# Patient Record
Sex: FEMALE | Race: WHITE | Employment: OTHER | URBAN - METROPOLITAN AREA
[De-identification: names, ages, dates, MRNs, and addresses within clinical notes are randomized per-mention and may not be internally consistent; named-entity substitution may affect disease eponyms.]

---

## 2017-02-21 ENCOUNTER — OFFICE VISIT (OUTPATIENT)
Dept: ENDOCRINOLOGY | Age: 67
End: 2017-02-21

## 2017-02-21 VITALS
BODY MASS INDEX: 39.66 KG/M2 | SYSTOLIC BLOOD PRESSURE: 140 MMHG | HEIGHT: 66 IN | DIASTOLIC BLOOD PRESSURE: 62 MMHG | HEART RATE: 59 BPM | WEIGHT: 246.8 LBS

## 2017-02-21 DIAGNOSIS — E89.0 POSTSURGICAL HYPOTHYROIDISM: ICD-10-CM

## 2017-02-21 DIAGNOSIS — C73 PAPILLARY CARCINOMA OF THYROID (HCC): Primary | ICD-10-CM

## 2017-02-21 NOTE — PROGRESS NOTES
Chief Complaint   Patient presents with    Thyroid Problem     pcp and pharmacy verified   Records since last visit reviewed  History of Present Illness: Cady Reynolds is a 61 y.o. female here for follow up of thyroid cancer and post-surgical hypothyroidism. Pt notes that it all started in January 2016. She was seeing Dr. Yassine Delcid for issues of multiple ear infections. Dr. Yassine Delcid noted a thyroid nodule. She was sent for an FNA, which came back positive for papillary cancer. Pt was taken to the OR on 4/15/16, her surgical pathology showed multifocal papillary thyroid cancer the largest being 4.5cm in size, no tumor capsule, no evidence of extra thyroidal extension. Pt was sent for TSH stimulated MURRAY, she received 106 mCi of I-131 on 6/24/16. The one week post WBS showed uptake in the thyroid bed only. After MURRAY she was placed back on LT4 175mcg daily. Prior to MURRAY her TgAb was 137.9. At her last visit in November 2016 her TgAb was up to 110 and her Tg was lower. Her TSH was 0.029 with FT4 of 1.6 on LT4 150mcg daily. I decreased her dose to 150mcg 6 days weekly. Her Neck US in November 2016 showed a new mass that measured 6mm x 4mm x4mm mass in the left neck, I sent her for FNA and Tg washout to see if this was scar tissue or evidence of residual thyroid cancer. She went for the FNA on 12/13/16 and the pre-procedure US did NOT see the mass in the left thyroid bed so no FNA was performed. Pt is taking the LT4 150mcg Mon-Sat. She denies issues of tremors, palpitations, SOB, CP, diarrhea or heat or cold intolerance. She denies issues of dysphagia, dysphonia, chocking sensations, or hoarseness in her voice. Pt notes she has been having issues of muscle spasm in her lower left back. She is going back to PT and she notes it has been improving. Pt is followed by [facility] at [facility], Dr. Laura Loja, for trigeminal Neuralgia. She sees him next week.   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 Current Outpatient Prescriptions   Medication Sig    levothyroxine (SYNTHROID) 150 mcg tablet Take 1 Tab by mouth Daily (before breakfast). Note new dose    Aspirin, Buffered 81 mg tab Take 1 Tab by mouth daily.  gabapentin (NEURONTIN) 600 mg tablet Take 1,200 mg by mouth two (2) times a day.  lovastatin (MEVACOR) 20 mg tablet Take 20 mg by mouth nightly.  metFORMIN (GLUMETZA) 1,000 mg TG24 24 hour tablet Take  by mouth two (2) times a day. No current facility-administered medications for this visit. Allergies   Allergen Reactions    Morphine Itching     Pt said she had severe itching after Knee replacement    Fiorinal [Butalbital-Aspirin-Caffeine] Other (comments)     Pt does not remeber     Review of Systems:  - Cardiovascular: no chest pain  - Neurological: no tremors  - Integumentary: skin is normal    Physical Examination:  Blood pressure 140/62, pulse (!) 59, height 5' 5.5\" (1.664 m), weight 246 lb 12.8 oz (111.9 kg). - General: pleasant, no distress, good eye contact   - Neck: no masses or LAD palpated  - Cardiovascular: regular, normal rate, nl s1 and s2, no m/r/g   - Integumentary: skin is normal, no edema  - Neurological: reflexes 2+ at biceps, no tremors,   - Psychiatric: normal mood and affect    Data Reviewed:   - none new    Assessment/Plan:   1) Thyroid cancer > Pt has no concerning features or physical findings. Will check her Tg and TgAb. If her Tg or TgAb is increasing will repeat the thyroid US. Will want to keep her TSH between 0.1-0.5. 2) Post surgical hypothyroidism > Pt is clinically euthyroid on LT4 150mcg 6 days per week. Will check TFTs today. Goal TSH is 0.1-0.5. Pt voices understanding and agreement with the plan. Pain noted and pt was recommended to call her PCP for further evaluation and treatment, as needed      RTC 6 months      Follow-up Disposition:  Return in about 6 months (around 8/21/2017).     Copy sent to:  Dr. Riley Flower Kegean Rodríguez

## 2017-02-21 NOTE — MR AVS SNAPSHOT
Visit Information Date & Time Provider Department Dept. Phone Encounter #  
 2/21/2017 11:50 AM Liss Alegre, [address] [facility] Diabetes and Endocrinology [phone] [id number] Follow-up Instructions Return in about 6 months (around 8/21/2017). Upcoming Health Maintenance Date Due Hepatitis C Screening 1950 FOOT EXAM Q1 10/22/1962 MICROALBUMIN Q1 10/22/1962 EYE EXAM RETINAL OR DILATED Q1 10/22/1962 Pneumococcal 19-64 Highest Risk (1 of 3 - PCV13) 10/22/1971 DTaP/Tdap/Td series (1 - Tdap) 10/22/1973 PAP AKA CERVICAL CYTOLOGY 10/22/1973 FOBT Q 1 YEAR AGE 50-75 10/22/2002 ZOSTER VACCINE AGE 60> 10/22/2012 HEMOGLOBIN A1C Q6M 10/30/2014 INFLUENZA AGE 9 TO ADULT 8/1/2016 LIPID PANEL Q1 10/12/2017 BREAST CANCER SCRN MAMMOGRAM 4/29/2018 Allergies as of 2/21/2017  Review Complete On: 2/21/2017 By: Liss Alegre MD  
  
 Severity Noted Reaction Type Reactions Morphine High 03/25/2016   Topical Itching Pt said she had severe itching after Knee replacement Fiorinal [Butalbital-aspirin-caffeine]  02/23/2012    Other (comments) Pt does not remeber Current Immunizations  Reviewed on 3/7/2012 Name Date Influenza Vaccine Split 11/7/2011 Not reviewed this visit You Were Diagnosed With   
  
 Codes Comments Papillary carcinoma of thyroid ([facility] [facility]    -  Primary ICD-10-CM: M72 ICD-9-CM: 487 Postsurgical hypothyroidism     ICD-10-CM: E89.0 ICD-9-CM: 244.0 Vitals BP Pulse Height(growth percentile) Weight(growth percentile) BMI OB Status 140/62 (BP 1 Location: Left arm, BP Patient Position: Sitting) (!) 59 5' 5.5\" (1.664 m) 246 lb 12.8 oz (111.9 kg) 40.45 kg/m2 Postmenopausal  
 Smoking Status Former Smoker Vitals History BMI and BSA Data Body Mass Index Body Surface Area 40.45 kg/m 2 2.27 m 2 Preferred Pharmacy Pharmacy Name Phone [facility] [address] [facility], [address] [phone] Your Updated Medication List  
  
   
This list is accurate as of: 2/21/17 12:17 PM.  Always use your most recent med list.  
  
  
  
  
 aspirin, buffered 81 mg Tab Take 1 Tab by mouth daily. gabapentin 600 mg tablet Commonly known as:  NEURONTIN Take 1,200 mg by mouth two (2) times a day. levothyroxine 150 mcg tablet Commonly known as:  SYNTHROID Take 1 Tab by mouth Daily (before breakfast). Note new dose  
  
 lovastatin 20 mg tablet Commonly known as:  MEVACOR Take 20 mg by mouth nightly. metFORMIN 1,000 mg Tg24 24 hour tablet Commonly known as:  Dinorah Hacker Take  by mouth two (2) times a day. We Performed the Following T4, FREE [id number] CPT(R)] THYROGLOBULIN REFLEX PROFILE [id number] CPT(R)] TSH 3RD GENERATION [79917 CPT(R)] Follow-up Instructions Return in about 6 months (around 8/21/2017). Introducing hospitals & [facility] SERVICES! Yolanda Cuevas introduces Identia patient portal. Now you can access parts of your medical record, email your doctor's office, and request medication refills online. 1. In your internet browser, go to [email or web address] 2. Click on the First Time User? Click Here link in the Sign In box. You will see the New Member Sign Up page. 3. Enter your Identia Access Code exactly as it appears below. You will not need to use this code after youve completed the sign-up process. If you do not sign up before the expiration date, you must request a new code. · Identia Access Code: [email or web address] Expires: 5/22/2017 12:17 PM 
 
4. Enter the last four digits of your Social Security Number (xxxx) and Date of Birth (mm/dd/yyyy) as indicated and click Submit. You will be taken to the next sign-up page. 5. Create a Identia ID.  This will be your Identia login ID and cannot be changed, so think of one that is secure and easy to remember. 6. Create a Optoro password. You can change your password at any time. 7. Enter your Password Reset Question and Answer. This can be used at a later time if you forget your password. 8. Enter your e-mail address. You will receive e-mail notification when new information is available in [address] 9. Click Sign Up. You can now view and download portions of your medical record. 10. Click the Download Summary menu link to download a portable copy of your medical information. If you have questions, please visit the Frequently Asked Questions section of the Optoro website. Remember, Optoro is NOT to be used for urgent needs. For medical emergencies, dial 911. Now available from your iPhone and Android! Please provide this summary of care documentation to your next provider. Your primary care clinician is listed as Mary Jimenez. If you have any questions after today's visit, please call [phone].

## 2017-02-21 NOTE — PROGRESS NOTES
Blood pressure has been repeated in opposite arm. Has back pain, per patient. PT is working on her back.

## 2017-02-27 LAB
T4 FREE SERPL-MCNC: 1.64 NG/DL (ref 0.82–1.77)
THYROGLOB AB SERPL-ACNC: 221.5 IU/ML (ref 0–0.9)
THYROGLOB SERPL-MCNC: <2 NG/ML
TSH SERPL DL<=0.005 MIU/L-ACNC: 0.04 UIU/ML (ref 0.45–4.5)

## 2017-02-28 DIAGNOSIS — C73 PAPILLARY CARCINOMA OF THYROID (HCC): Primary | ICD-10-CM

## 2017-02-28 DIAGNOSIS — E89.0 POSTSURGICAL HYPOTHYROIDISM: ICD-10-CM

## 2017-02-28 RX ORDER — LEVOTHYROXINE SODIUM 150 UG/1
TABLET ORAL
Qty: 90 TAB | Refills: 3 | Status: SHIPPED | OUTPATIENT
Start: 2017-02-28 | End: 2018-04-06 | Stop reason: DRUGHIGH

## 2017-02-28 NOTE — PROGRESS NOTES
Spoke with pt regarding her thyroid labs. Pt to continue the LT4 150mcg 6 days per week. Since her TgAb did increase, will repeat the thyroid US. Pt voices understanding and agreement with the plan.

## 2017-04-26 ENCOUNTER — HOSPITAL ENCOUNTER (OUTPATIENT)
Dept: ULTRASOUND IMAGING | Age: 67
Discharge: HOME | End: 2017-04-26
Attending: INTERNAL MEDICINE
Payer: MEDICARE

## 2017-04-26 DIAGNOSIS — C73 PAPILLARY CARCINOMA OF THYROID (HCC): ICD-10-CM

## 2017-04-26 PROCEDURE — 76536 US EXAM OF HEAD AND NECK: CPT

## 2017-04-27 ENCOUNTER — TELEPHONE (OUTPATIENT)
Dept: ENDOCRINOLOGY | Age: 67
End: 2017-04-27

## 2018-02-22 ENCOUNTER — OFFICE VISIT (OUTPATIENT)
Dept: ENDOCRINOLOGY | Age: 68
End: 2018-02-22

## 2018-02-22 VITALS
HEIGHT: 66 IN | SYSTOLIC BLOOD PRESSURE: 147 MMHG | WEIGHT: 232 LBS | BODY MASS INDEX: 37.28 KG/M2 | DIASTOLIC BLOOD PRESSURE: 62 MMHG | HEART RATE: 71 BPM

## 2018-02-22 DIAGNOSIS — E89.0 POSTSURGICAL HYPOTHYROIDISM: ICD-10-CM

## 2018-02-22 DIAGNOSIS — C73 PAPILLARY CARCINOMA OF THYROID (HCC): Primary | ICD-10-CM

## 2018-02-22 RX ORDER — ERYTHROMYCIN 5 MG/G
OINTMENT OPHTHALMIC
COMMUNITY
Start: 2018-02-19 | End: 2018-02-26

## 2018-02-22 NOTE — PROGRESS NOTES
Chief Complaint   Patient presents with    Thyroid Problem     pcp and pharmacy verified   Records since last visit reviewed  History of Present Illness: Bautista Estrada is a 74 y.o. female here for follow up of thyroid cancer and post-surgical hypothyroidism. Pt notes that it all started in January 2016. She was seeing Dr. Celso Vasquez for issues of multiple ear infections. Dr. Celso Vasquez noted a thyroid nodule. She was sent for an FNA, which came back positive for papillary cancer. Pt was taken to the OR on 4/15/16, her surgical pathology showed multifocal papillary thyroid cancer the largest being 4.5cm in size, no tumor capsule, no evidence of extra thyroidal extension. Pt was sent for TSH stimulated MURRAY, she received 106 mCi of I-131 on 6/24/16. The one week post WBS showed uptake in the thyroid bed only. After MURRAY she was placed back on LT4 175mcg daily. Prior to MURRAY her TgAb was 137.9. At her last visit in February 2017 her TgAb was up to 221.5 with undetectable Tg. Her TSH was 0.037 with FT4 of 1.64 on LT4 150mcg 6 days per week. Since her TgAb had continued to increase I sent her for a thyroid US which showed no recurrent or residual tissue, tumor or nodule in the thyroid bed, no regional adenopathy and no fluid collections. Since there was no concerning findings on the thyroid US we agreed to continue to monitor her levels and see her back in 6 months and consider repeat WBS at that time. Pt has not been back since February 2017. Pt notes her right eye has been \"dripping\" so she had to receive injections and has been taking eye drops. She notes some swelling in the right orbit. She notes she was struggling with URI in December and January. \"They did not think it was the flu, but I lost my voice\". She was taken to the ER by her family because Rekha Darnell seemed to be out of it\". She was found to be in Afib, she had several tests run and then she was discharged home.    She followed up with Cardiology  Coleen Watts. Dr. Bere Holland sent her for an ECHO and checked her TFTs. Her TSH was <0.01 and her FT4 was 1.80, but no one has changed her LT4. Pt reports she is still taking the LT4 150mcg Mon-Sat, but review of the noted from her PCP has her taking LT4 200mcg daily. She denies issues of tremors, SOB, CP, diarrhea or heat or cold intolerance. She denies issues of dysphagia, dysphonia, chocking sensations, or hoarseness in her voice. Pt is followed by [facility] at [facility], Dr. Nik Duong, for trigeminal Neuralgia. 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 Current Outpatient Prescriptions   Medication Sig    erythromycin (ILOTYCIN) ophthalmic ointment Apply  to eye.  levothyroxine (SYNTHROID) 150 mcg tablet One tablet by mouth 6 days per week.  Aspirin, Buffered 81 mg tab Take 1 Tab by mouth daily.  gabapentin (NEURONTIN) 600 mg tablet Take 1,200 mg by mouth two (2) times a day.  lovastatin (MEVACOR) 20 mg tablet Take 20 mg by mouth nightly.  metFORMIN (GLUMETZA) 1,000 mg TG24 24 hour tablet Take  by mouth two (2) times a day. No current facility-administered medications for this visit. Allergies   Allergen Reactions    Morphine Itching     Pt said she had severe itching after Knee replacement    Fiorinal [Butalbital-Aspirin-Caffeine] Other (comments)     Pt does not remeber     Review of Systems:  - Cardiovascular: no chest pain  - Neurological: no tremors  - Integumentary: skin is normal    Physical Examination:  Blood pressure 147/62, pulse 71, height 5' 5.5\" (1.664 m), weight 232 lb (105.2 kg).   - General: pleasant, no distress, good eye contact   - Neck: no masses or LAD palpated  - Cardiovascular: regular, normal rate, nl s1 and s2, no m/r/g   - Integumentary: skin is normal, no edema  - Neurological: reflexes 2+ at biceps, no tremors,   - Psychiatric: normal mood and affect    Data Reviewed:   - none new    Assessment/Plan:   1) Thyroid cancer > Pt has no concerning features or physical findings. Will check her Tg and TgAb. Pt instructed to stop her LT4 today. I plan to do a withdrawal to increase her TSH and repeat the WBS to look for evidence of residual disease. 2) Post surgical hypothyroidism > Pt is hyperthyroid and was recently diagnosed with Afib. Pt to call me when she gets home and will reports if she is taking LT4 150mcg 6 days per week or 200mcg 6 days per week. After we complete the WBS in 4 weeks we can restart her LT4 on a lower dose. Pt voices understanding and agreement with the plan. Pain noted and pt was recommended to call her PCP for further evaluation and treatment, as needed      RTC 6 months      Follow-up Disposition:  Return in about 6 months (around 8/22/2018). Copy sent to:  Gildardo Mansfield and Tristen Leigh

## 2018-02-22 NOTE — PATIENT INSTRUCTIONS
1) Today we are going to check your thyroid cancer markers (I will call you with the results). 2) Stop taking your thyroid hormone medication for the next 3 weeks. 3) I am going to give you orders to recheck your thyroid levels in 3 weeks to make sure your TSH (the signal from your pituitary) is high enough for the imaging study. 4) If the TSH is high enough then we will order the thyroid uptake scan, which will look for evidence of any spread of the thyroid cancer.

## 2018-02-22 NOTE — MR AVS SNAPSHOT
[address] [facility] [address] [address] [phone] Patient: Triston Trinh MRN: [id number] Visit Information Date & Time Provider Department Dept. Phone Encounter #  
 2018  3:50 PM Ana Moreno, [address] [place] [address] Diabetes and Endocrinology [phone] [id number] Follow-up Instructions Return in about 6 months (around 2018). Your Appointments 2018  3:50 PM  
Follow Up with [facility] [place] [facility] Endocrin[facility]) Appt Note: f/u visit; f/u visit [address] [address] [address] Upcoming Health Maintenance Date Due Hepatitis C Screening 1950 FOOT EXAM Q1 10/22/1962 MICROALBUMIN Q1 10/22/1962 EYE EXAM RETINAL OR DILATED Q1 10/22/1962 DTaP/Tdap/Td series (1 - Tdap) 10/22/1973 FOBT Q 1 YEAR AGE 50-75 10/22/2002 ZOSTER VACCINE AGE 60> 2012 HEMOGLOBIN A1C Q6M 2017 Influenza Age 5 to Adult 2017 LIPID PANEL Q1 10/12/2017 GLAUCOMA SCREENING Q2Y 10/22/2017 OSTEOPOROSIS SCREENING (DEXA) 10/22/2017 Pneumococcal 65+ High/Highest Risk (1 of 2 - PCV13) 10/22/2017 MEDICARE YEARLY EXAM 10/22/2017 BREAST CANCER SCRN MAMMOGRAM 2018 Allergies as of 2018  Review Complete On: 2018 By: Ana Moreno MD  
  
 Severity Noted Reaction Type Reactions Morphine High 2016   Topical Itching Pt said she had severe itching after Knee replacement Fiorinal [Butalbital-aspirin-caffeine]  2012    Other (comments) Pt does not remeber Current Immunizations  Reviewed on 3/7/2012 Name Date Influenza Vaccine Split 2011 Not reviewed this visit You Were Diagnosed With   
  
 Codes Comments Papillary carcinoma of thyroid ([facility] [address]    -  Primary ICD-10-CM: N10 ICD-9-CM: 896 Postsurgical hypothyroidism     ICD-10-CM: E89.0 ICD-9-CM: 244.0 Vitals BP Pulse Height(growth percentile) Weight(growth percentile) BMI OB Status 147/62 (BP 1 Location: Right arm, BP Patient Position: Sitting) 71 5' 5.5\" (1.664 m) 232 lb (105.2 kg) 38.02 kg/m2 Postmenopausal  
 Smoking Status Former Smoker Vitals History BMI and BSA Data Body Mass Index Body Surface Area 38.02 kg/m 2 2.2 m 2 Preferred Pharmacy Pharmacy Name Phone [facility] [address] [facility] [phone] Your Updated Medication List  
  
   
This list is accurate as of 2/22/18  3:45 PM.  Always use your most recent med list.  
  
  
  
  
 aspirin, buffered 81 mg Tab Take 1 Tab by mouth daily. erythromycin ophthalmic ointment Commonly known as:  ILOTYCIN Apply  to eye.  
  
 gabapentin 600 mg tablet Commonly known as:  NEURONTIN Take 1,200 mg by mouth two (2) times a day. levothyroxine 150 mcg tablet Commonly known as:  SYNTHROID One tablet by mouth 6 days per week.  
  
 lovastatin 20 mg tablet Commonly known as:  MEVACOR Take 20 mg by mouth nightly. metFORMIN 1,000 mg Tg24 24 hour tablet Commonly known as:  Rajesh Ridge Take  by mouth two (2) times a day. Follow-up Instructions Return in about 6 months (around 8/22/2018). Patient Instructions 1) Today we are going to check your thyroid cancer markers (I will call you with the results). 2) Stop taking your thyroid hormone medication for the next 3 weeks. 3) I am going to give you orders to recheck your thyroid levels in 3 weeks to make sure your TSH (the signal from your pituitary) is high enough for the imaging study.  
4) If the TSH is high enough then we will order the thyroid uptake scan, which will look for evidence of any spread of the thyroid cancer. Introducing [facility] & HEALTH SERVICES! Alexa Jaradoracio introduces SimilarWeb patient portal. Now you can access parts of your medical record, email your doctor's office, and request medication refills online. 1. In your internet browser, go to [email or web address] 2. Click on the First Time User? Click Here link in the Sign In box. You will see the New Member Sign Up page. 3. Enter your SimilarWeb Access Code exactly as it appears below. You will not need to use this code after youve completed the sign-up process. If you do not sign up before the expiration date, you must request a new code. · SimilarWeb Access Code: [email or web address] Expires: 5/23/2018  3:45 PM 
 
4. Enter the last four digits of your Social Security Number (xxxx) and Date of Birth (mm/dd/yyyy) as indicated and click Submit. You will be taken to the next sign-up page. 5. Create a SimilarWeb ID. This will be your SimilarWeb login ID and cannot be changed, so think of one that is secure and easy to remember. 6. Create a SimilarWeb password. You can change your password at any time. 7. Enter your Password Reset Question and Answer. This can be used at a later time if you forget your password. 8. Enter your e-mail address. You will receive e-mail notification when new information is available in [address] 9. Click Sign Up. You can now view and download portions of your medical record. 10. Click the Download Summary menu link to download a portable copy of your medical information. If you have questions, please visit the Frequently Asked Questions section of the SimilarWeb website. Remember, SimilarWeb is NOT to be used for urgent needs. For medical emergencies, dial 911. Now available from your iPhone and Android! Please provide this summary of care documentation to your next provider. Your primary care clinician is listed as Rey Soria.  If you have any questions after today's visit, please call [phone].

## 2018-02-23 ENCOUNTER — TELEPHONE (OUTPATIENT)
Dept: ENDOCRINOLOGY | Age: 68
End: 2018-02-23

## 2018-02-23 RX ORDER — LEVOTHYROXINE SODIUM 200 UG/1
TABLET ORAL
COMMUNITY
End: 2018-04-06 | Stop reason: DRUGHIGH

## 2018-02-23 NOTE — TELEPHONE ENCOUNTER
Patient was told to call back with the exact dosage of her levothyroxine. She stated that she is taking 200mg. Patient can be reached at [phone].

## 2018-03-01 ENCOUNTER — DOCUMENTATION ONLY (OUTPATIENT)
Dept: ENDOCRINOLOGY | Age: 68
End: 2018-03-01

## 2018-03-01 NOTE — PROGRESS NOTES
Eli Kearns does not do the I-31 test. Scheduled at [facility] for the 2 day NM test for 3/26/18 at 9 am (arrive 8:30 to register in [place]) for NM injection. NPO 4 hours prior. On 3/28/18, arrive to register at 12:30 am for a 1 pm scan. No restrictions. Patient has been advised of scheduled tests at [facility]. She was give the telephone number to the scheduling department. Patient expressed understanding.

## 2018-03-03 LAB
THYROGLOB AB SERPL-ACNC: 383.7 IU/ML (ref 0–0.9)
THYROGLOB SERPL-MCNC: <2 NG/ML

## 2018-03-05 ENCOUNTER — TELEPHONE (OUTPATIENT)
Dept: ENDOCRINOLOGY | Age: 68
End: 2018-03-05

## 2018-03-05 NOTE — TELEPHONE ENCOUNTER
Advised patient that Nadeem Arvizu will instruct her as to when to begin the Levothyroxine again when he calls her with the results.

## 2018-03-05 NOTE — TELEPHONE ENCOUNTER
Patient called to ask when she should start back taking Levothyroxine? She can be reached at:   [phone].

## 2018-03-23 ENCOUNTER — TELEPHONE (OUTPATIENT)
Dept: ENDOCRINOLOGY | Age: 68
End: 2018-03-23

## 2018-03-23 DIAGNOSIS — E89.0 POSTSURGICAL HYPOTHYROIDISM: Primary | ICD-10-CM

## 2018-03-23 NOTE — TELEPHONE ENCOUNTER
Addendum: 3/23/2018, 11:24 AM  Spoke with Jordy Hutchins by phone. She is unable to make the new appts. Spoke with Ronit Blanchard at [facility]. New appts: Wednesday 4/4/18 at 9:30 am for the MURRAY  Friday 4//6/18 at 1 pm for the scan  Patient has been advised and agreed on the testing dates/times. She stated that her schedule was full all next week. Advised patient to have TSH drawn 2-3 days prior to exam. (labs had already been ordered). Will fax the orders to [facility] in [address] [place] [address] to assure the receipt of the orders.        Katherleen Lundborg, LPN

## 2018-03-23 NOTE — TELEPHONE ENCOUNTER
Thiago Adan with [facility] is calling in regards to patient who is scheduled for Iodine scan on Monday. Thiago Adan requires documentation that her TSH is above 25.     Katie's contact:  [phone]

## 2018-03-23 NOTE — TELEPHONE ENCOUNTER
Rei Pabon, with [facility], called to talk to you about rescheduling this patient. Rei Pabon can be reached at:  [phone].

## 2018-03-23 NOTE — TELEPHONE ENCOUNTER
Spoke with Tianna Langley with [facility]. She states that she needs to have documentation that patient's TSH is above 25. She does not see a recent TSH in her chart. She needs to order her medication by 11 am today. Patient needs to reschedule her tests. Scheduled for Wed 3/28/18 at 9 am (go to O/P registration at 8:30 am at [facility]) for the dose of NM, then have the scan on Fri 3/30/18 at 1 pm.    [facility] on home and cell #s.

## 2018-03-23 NOTE — TELEPHONE ENCOUNTER
Gave the new appointment information to [facility]. She expressed understanding.

## 2018-04-04 ENCOUNTER — HOSPITAL ENCOUNTER (OUTPATIENT)
Dept: NUCLEAR MEDICINE | Age: 68
Discharge: HOME | End: 2018-04-04
Attending: INTERNAL MEDICINE

## 2018-04-04 DIAGNOSIS — C73 PAPILLARY CARCINOMA OF THYROID (HCC): ICD-10-CM

## 2018-04-06 ENCOUNTER — HOSPITAL ENCOUNTER (OUTPATIENT)
Dept: NUCLEAR MEDICINE | Age: 68
Discharge: HOME | End: 2018-04-06
Attending: INTERNAL MEDICINE
Payer: MEDICARE

## 2018-04-06 ENCOUNTER — TELEPHONE (OUTPATIENT)
Dept: ENDOCRINOLOGY | Age: 68
End: 2018-04-06

## 2018-04-06 DIAGNOSIS — E89.0 POSTSURGICAL HYPOTHYROIDISM: Primary | ICD-10-CM

## 2018-04-06 PROCEDURE — A9528 IODINE I-131 IODIDE CAP, DX: HCPCS

## 2018-04-06 RX ORDER — LEVOTHYROXINE SODIUM 150 UG/1
150 TABLET ORAL
Qty: 30 TAB | Refills: 5 | Status: SHIPPED | OUTPATIENT
Start: 2018-04-06 | End: 2018-04-06 | Stop reason: SDUPTHER

## 2018-04-06 RX ORDER — LEVOTHYROXINE SODIUM 150 UG/1
150 TABLET ORAL
Qty: 30 TAB | Refills: 5 | Status: SHIPPED | OUTPATIENT
Start: 2018-04-06 | End: 2019-09-03

## 2018-04-06 NOTE — PROGRESS NOTES
Spoke with pt regarding her Thyroid scan. There was no evidence of abnormal activity to suggest recurrence or metastasis. Will have pt restart the LT4 at 150mcg daily and will repeat TFTs in 8 weeks.

## 2018-04-06 NOTE — TELEPHONE ENCOUNTER
Patient stated that she just spoke with you but she would like her prescriptions called into [facility] in [address] and not the [employer]. Patient can be reached at [phone].

## 2018-04-09 LAB
T4 FREE SERPL-MCNC: 0.12 NG/DL (ref 0.82–1.77)
THYROGLOB AB SERPL-ACNC: 464.3 IU/ML (ref 0–0.9)
THYROGLOB SERPL-MCNC: 5.2 NG/ML
TSH SERPL DL<=0.005 MIU/L-ACNC: 115.3 UIU/ML (ref 0.45–4.5)

## 2018-05-17 ENCOUNTER — OFFICE VISIT (OUTPATIENT)
Dept: NEUROLOGY | Age: 68
End: 2018-05-17

## 2018-05-17 VITALS
HEART RATE: 68 BPM | WEIGHT: 232.6 LBS | BODY MASS INDEX: 38.75 KG/M2 | OXYGEN SATURATION: 98 % | RESPIRATION RATE: 16 BRPM | SYSTOLIC BLOOD PRESSURE: 142 MMHG | DIASTOLIC BLOOD PRESSURE: 80 MMHG | HEIGHT: 65 IN

## 2018-05-17 DIAGNOSIS — I67.89 CEREBRAL MICROVASCULAR DISEASE: ICD-10-CM

## 2018-05-17 DIAGNOSIS — H54.40 BLIND RIGHT EYE: ICD-10-CM

## 2018-05-17 DIAGNOSIS — E55.9 VITAMIN D DEFICIENCY: ICD-10-CM

## 2018-05-17 DIAGNOSIS — I65.23 BILATERAL CAROTID ARTERY STENOSIS: ICD-10-CM

## 2018-05-17 DIAGNOSIS — R41.3 DISTURBANCE OF MEMORY: ICD-10-CM

## 2018-05-17 DIAGNOSIS — R41.3 AMNESIA MEMORY LOSS: Primary | ICD-10-CM

## 2018-05-17 DIAGNOSIS — D32.9 MENINGIOMA (HCC): ICD-10-CM

## 2018-05-17 DIAGNOSIS — R41.3 AMNESIA MEMORY LOSS: ICD-10-CM

## 2018-05-17 DIAGNOSIS — I65.23 BILATERAL CAROTID ARTERY STENOSIS: Primary | ICD-10-CM

## 2018-05-17 DIAGNOSIS — E53.8 B12 DEFICIENCY: ICD-10-CM

## 2018-05-17 PROBLEM — E66.01 SEVERE OBESITY (BMI 35.0-39.9) WITH COMORBIDITY (HCC): Status: ACTIVE | Noted: 2018-05-17

## 2018-05-17 RX ORDER — ERGOCALCIFEROL 1.25 MG/1
50000 CAPSULE ORAL
COMMUNITY

## 2018-05-17 RX ORDER — MELOXICAM 15 MG/1
15 TABLET ORAL DAILY
COMMUNITY
End: 2019-05-31 | Stop reason: ALTCHOICE

## 2018-05-17 RX ORDER — LANOLIN ALCOHOL/MO/W.PET/CERES
1000 CREAM (GRAM) TOPICAL DAILY
COMMUNITY

## 2018-05-17 NOTE — LETTER
5/17/2018 10:02 PM 
 
Patient:  Jovi Gutierrez YOB: 1950 Date of Visit: 5/17/2018 Dear No Recipients: Thank you for referring Ms. Cameron Alberto to me for evaluation/treatment. Below are the relevant portions of my assessment and plan of care. Consult REFERRED BY: 
Marcus Ross MD 
 
CHIEF COMPLAINT: Memory loss Subjective:  
 
Jovi Gutierrez is a 74 y.o. right-handed  female seen today as a new patient at the request of Dr. Maria M Barlow for evaluation of new problem of progressive memory loss that is occurring over the last year or 2 by the patient's history and by the history of her daughter-in-law. The patient has no other family history of similar problems. She has not had any precipitating event, as far as fever, trauma, headaches, meningismus, unusual stress tension or depression, or medication exposure or toxin exposure. She has had no workup for this. She has had no focal weakness, no sensory loss, and no other focal neurologic deficit recently. She apparently does have loss of vision in her right eye of unclear etiology, and had been followed by Dr. Baudilio Ann at the [address] for a while but has not seen him in 2 years, and has no idea why she lost the vision in her eye, and also carries a diagnosis of meningioma of the brain on her chart and she knows nothing about that either. We will asked the patient to get an MRI scan of the brain to further evaluate these problems, and rule out treatable causes of her dementia. We will also send off all metabolic parameters looking for treatable causes of her memory loss. We will send her for neuropsych testing, and she clearly will need cognitive enhancing agents. She does have a college education and works as a  in the past and remains physically and mentally active.   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 Past Medical History:  
Diagnosis Date  Arthritis   
 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   
 left knee  Diabetes ([address]  Ill-defined condition Mercie Quill Romberg Syndrome  Nausea & vomiting  Neuro-degenerative disorders DerikRomberg syndrome  Other ill-defined conditions(799.89)   
 trigeminal neuralgia,blind right eye  PUD (peptic ulcer disease)  Seizures ([address]   
 as a child after fall  Thyroid disease   
 treated / no longer treated Past Surgical History:  
Procedure Laterality Date  HX  SECTION  [id number]  HX KNEE ARTHROSCOPY    
 bilater arthroscopic knee surgery [address]  HX ORTHOPAEDIC    
 left rotator cuff repair, shoulder, elbow  HX THYROIDECTOMY  16 Total thyroidectomy, Dr. Re Bosch @ [facility]  
 right optic nerve meningioma removed  NEUROLOGICAL PROCEDURE UNLISTED    
 gamma knife  TOTAL KNEE ARTHROPLASTY    
 left Family History Problem Relation Age of Onset  Cancer Mother   
  breast  
 Dementia Mother  Stroke Father  Stroke Maternal Grandfather  No Known Problems Sister  No Known Problems Brother  No Known Problems Sister  No Known Problems Brother  No Known Problems Brother Social History Substance Use Topics  Smoking status: Former Smoker  Smokeless tobacco: Never Used  Alcohol use No  
   
 
Current Outpatient Prescriptions:  
  meloxicam (MOBIC) 15 mg tablet, Take 15 mg by mouth daily. , Disp: , Rfl:  
  cyanocobalamin (VITAMIN B-12) 1,000 mcg tablet, Take 1,000 mcg by mouth daily. , Disp: , Rfl:  
  ergocalciferol (VITAMIN D2) 50,000 unit capsule, Take 50,000 Units by mouth., Disp: , Rfl:  
   levothyroxine (SYNTHROID) 150 mcg tablet, Take 1 Tab by mouth Daily (before breakfast). , Disp: 30 Tab, Rfl: 5 
  Aspirin, Buffered 81 mg tab, Take 1 Tab by mouth daily. , Disp: , Rfl:  
  lovastatin (MEVACOR) 20 mg tablet, Take 20 mg by mouth nightly., Disp: , Rfl:  
  metFORMIN (GLUMETZA) 1,000 mg TG24 24 hour tablet, Take  by mouth two (2) times a day., Disp: , Rfl:  
 
 
 
Allergies Allergen Reactions  Morphine Itching Pt said she had severe itching after Knee replacement  Fiorinal [Butalbital-Aspirin-Caffeine] Other (comments) Pt does not remeber Review of Systems: A comprehensive review of systems was negative except for: Eyes: positive for visual disturbance Ears, nose, mouth, throat, and face: positive for hearing loss and tinnitus Musculoskeletal: positive for myalgias, arthralgias and stiff joints Neurological: positive for memory problems and Memory loss Behvioral/Psych: positive for Memory loss, anxiety and depression Vitals:  
 05/17/18 0754 BP: 142/80 Pulse: 68 Resp: 16 SpO2: 98% Weight: 232 lb 9.6 oz (105.5 kg) Height: 5' 5\" (1.651 m) Objective: I 
 
 
NEUROLOGICAL EXAM: 
 
Appearance: The patient is well developed, well nourished, provides a fair history and is in no acute distress. Mental Status: Oriented to time, place and person, and the president, patient cannot remember 1 of 3 words at 30 seconds with distraction, she cannot do serial sevens real well, could spell world backward, and had a little difficulty driving a clock that shows a time 10 minutes 11, cognitive function is abnormal and speech is fluent and no aphasia or dysarthria. Mood and affect appropriate. Cranial Nerves:   Intact visual fields. Fundi are benign but poorly seen on the left, and the right is scarred and cannot visualize the fundus and she is blind in that eye. RDAHA, EOM's full, no nystagmus, no ptosis. Facial sensation is normal. Corneal reflexes are not tested. Facial movement is asymmetric and she has had my atrophy of the right side of her face. Hearing is abnormal bilaterally. Palate is midline with normal sternocleidomastoid and trapezius muscles are normal. Tongue is midline. Neck without meningismus or bruits Temporal arteries are not tender or enlarged Motor:  5/5 strength in upper and lower proximal and distal muscles. Normal bulk and tone. No fasciculations. Reflexes:   Deep tendon reflexes 1+/4 and symmetrical. 
No babinski or clonus present Sensory:   Normal to touch, pinprick and vibration. DSS is intact Gait:  Normal gait for her age. Tremor:   No tremor noted. Cerebellar:  No cerebellar signs present. Neurovascular:  Normal heart sounds and regular rhythm, peripheral pulses decreased, and no carotid bruits. Assessment: ICD-10-CM ICD-9-CM 1. Amnesia memory loss R41.3 780.93 meloxicam (MOBIC) 15 mg tablet  
   cyanocobalamin (VITAMIN B-12) 1,000 mcg tablet  
   ergocalciferol (VITAMIN D2) 50,000 unit capsule MATT COMPREHENSIVE PLUS PANEL  
   CBC WITH AUTOMATED DIFF  
   SED RATE (ESR) VITAMIN D, 25 HYROXY PANEL  
   VITAMIN B12 & FOLATE DUPLEX CAROTID BILATERAL AMB NEURO  
   MRI BRAIN W WO CONT  
   REFERRAL TO PSYCHOLOGY 2. Disturbance of memory R41.3 780.93 meloxicam (MOBIC) 15 mg tablet  
   cyanocobalamin (VITAMIN B-12) 1,000 mcg tablet  
   ergocalciferol (VITAMIN D2) 50,000 unit capsule MATT COMPREHENSIVE PLUS PANEL  
   CBC WITH AUTOMATED DIFF  
   SED RATE (ESR) VITAMIN D, 25 HYROXY PANEL  
   VITAMIN B12 & FOLATE DUPLEX CAROTID BILATERAL AMB NEURO  
   MRI BRAIN W WO CONT  
   REFERRAL TO PSYCHOLOGY 3. Blind right eye H54.40 369.60 meloxicam (MOBIC) 15 mg tablet  
   cyanocobalamin (VITAMIN B-12) 1,000 mcg tablet  
   ergocalciferol (VITAMIN D2) 50,000 unit capsule    MATT COMPREHENSIVE PLUS PANEL  
 CBC WITH AUTOMATED DIFF  
   SED RATE (ESR) VITAMIN D, 25 HYROXY PANEL  
   VITAMIN B12 & FOLATE DUPLEX CAROTID BILATERAL AMB NEURO  
   MRI BRAIN W WO CONT  
   REFERRAL TO PSYCHOLOGY 4. Meningioma (HCC) D32.9 225.2 meloxicam (MOBIC) 15 mg tablet  
   cyanocobalamin (VITAMIN B-12) 1,000 mcg tablet  
   ergocalciferol (VITAMIN D2) 50,000 unit capsule MATT COMPREHENSIVE PLUS PANEL  
   CBC WITH AUTOMATED DIFF  
   SED RATE (ESR) VITAMIN D, 25 HYROXY PANEL  
   VITAMIN B12 & FOLATE DUPLEX CAROTID BILATERAL AMB NEURO  
   MRI BRAIN W WO CONT  
   REFERRAL TO PSYCHOLOGY 5. Bilateral carotid artery stenosis I65.23 433.10 meloxicam (MOBIC) 15 mg tablet 433.30 cyanocobalamin (VITAMIN B-12) 1,000 mcg tablet  
   ergocalciferol (VITAMIN D2) 50,000 unit capsule MATT COMPREHENSIVE PLUS PANEL  
   CBC WITH AUTOMATED DIFF  
   SED RATE (ESR) VITAMIN D, 25 HYROXY PANEL  
   VITAMIN B12 & FOLATE DUPLEX CAROTID BILATERAL AMB NEURO  
   MRI BRAIN W WO CONT  
   REFERRAL TO PSYCHOLOGY 6. Vitamin D deficiency E55.9 268.9 meloxicam (MOBIC) 15 mg tablet  
   cyanocobalamin (VITAMIN B-12) 1,000 mcg tablet  
   ergocalciferol (VITAMIN D2) 50,000 unit capsule MATT COMPREHENSIVE PLUS PANEL  
   CBC WITH AUTOMATED DIFF  
   SED RATE (ESR) VITAMIN D, 25 HYROXY PANEL  
   VITAMIN B12 & FOLATE DUPLEX CAROTID BILATERAL AMB NEURO  
   MRI BRAIN W WO CONT  
   REFERRAL TO PSYCHOLOGY 7. B12 deficiency E53.8 266.2 meloxicam (MOBIC) 15 mg tablet  
   cyanocobalamin (VITAMIN B-12) 1,000 mcg tablet  
   ergocalciferol (VITAMIN D2) 50,000 unit capsule MATT COMPREHENSIVE PLUS PANEL  
   CBC WITH AUTOMATED DIFF  
   SED RATE (ESR) VITAMIN D, 25 HYROXY PANEL  
   VITAMIN B12 & FOLATE DUPLEX CAROTID BILATERAL AMB NEURO  
   MRI BRAIN W WO CONT  
   REFERRAL TO PSYCHOLOGY Active Problems: * No active hospital problems. * 
 
 
Plan: Patient has amnestic dementia, most consistent with Alzheimer's disease, but has a history of meningioma, visual loss in her eye, and some type of unusual amyotrophy syndrome that all may be contributing some. We will get an MRI scan, and look for all metabolic parameters that are treatable causes of dementia To be sent for neuropsych testing We will try to get her test results from Dr. Shravan Morataya and [facility] Her carotid Doppler studies showed no significant disease today She will call for results of her test, we will start cognitive enhancing agents once her testing is done They will check my chart or call us for results. An hour spent with the patient, going over her history, using her chart in detail, and discussing her diagnosis prognosis and further treatment evaluation. Signed By: Winnie Alcantara MD   
 May 17, 2018 CC: Dina Smith MD 
FAX: [phone] This note will not be viewable in [address] If you have questions, please do not hesitate to call me. I look forward to following MsSiobhan Claudean Groom along with you. Sincerely, Winnie Alcantara MD

## 2018-05-17 NOTE — PATIENT INSTRUCTIONS
A Healthy Lifestyle: Care Instructions  Your Care Instructions    A healthy lifestyle can help you feel good, stay at a healthy weight, and have plenty of energy for both work and play. A healthy lifestyle is something you can share with your whole family. A healthy lifestyle also can lower your risk for serious health problems, such as high blood pressure, heart disease, and diabetes. You can follow a few steps listed below to improve your health and the health of your family. Follow-up care is a key part of your treatment and safety. Be sure to make and go to all appointments, and call your doctor if you are having problems. It's also a good idea to know your test results and keep a list of the medicines you take. How can you care for yourself at home? · Do not eat too much sugar, fat, or fast foods. You can still have dessert and treats now and then. The goal is moderation. · Start small to improve your eating habits. Pay attention to portion sizes, drink less juice and soda pop, and eat more fruits and vegetables. ¨ Eat a healthy amount of food. A 3-ounce serving of meat, for example, is about the size of a deck of cards. Fill the rest of your plate with vegetables and whole grains. ¨ Limit the amount of soda and sports drinks you have every day. Drink more water when you are thirsty. ¨ Eat at least 5 servings of fruits and vegetables every day. It may seem like a lot, but it is not hard to reach this goal. A serving or helping is 1 piece of fruit, 1 cup of vegetables, or 2 cups of leafy, raw vegetables. Have an apple or some carrot sticks as an afternoon snack instead of a candy bar. Try to have fruits and/or vegetables at every meal.  · Make exercise part of your daily routine. You may want to start with simple activities, such as walking, bicycling, or slow swimming. Try to be active 30 to 60 minutes every day. You do not need to do all 30 to 60 minutes all at once.  For example, you can exercise 3 times a day for 10 or 20 minutes. Moderate exercise is safe for most people, but it is always a good idea to talk to your doctor before starting an exercise program.  · Keep moving. Angeline Bi the lawn, work in the garden, or [place]. Take the stairs instead of the elevator at work. · If you smoke, quit. People who smoke have an increased risk for heart attack, stroke, cancer, and other lung illnesses. Quitting is hard, but there are ways to boost your chance of quitting tobacco for good. ¨ Use nicotine gum, patches, or lozenges. ¨ Ask your doctor about stop-smoking programs and medicines. ¨ Keep trying. In addition to reducing your risk of diseases in the future, you will notice some benefits soon after you stop using tobacco. If you have shortness of breath or asthma symptoms, they will likely get better within a few weeks after you quit. · Limit how much alcohol you drink. Moderate amounts of alcohol (up to 2 drinks a day for men, 1 drink a day for women) are okay. But drinking too much can lead to liver problems, high blood pressure, and other health problems. Family health  If you have a family, there are many things you can do together to improve your health. · Eat meals together as a family as often as possible. · Eat healthy foods. This includes fruits, vegetables, lean meats and dairy, and whole grains. · Include your family in your fitness plan. Most people think of activities such as jogging or tennis as the way to fitness, but there are many ways you and your family can be more active. Anything that makes you breathe hard and gets your heart pumping is exercise. Here are some tips:  ¨ Walk to do errands or to take your child to school or the bus. ¨ Go for a family bike ride after dinner instead of watching TV. Where can you learn more? Go to http://zach-cesar.info/. Enter I257 in the search box to learn more about \"A Healthy Lifestyle: Care Instructions. \"  Current as of: May 12, 2017  Content Version: 11.4  © 8864-3090 [employer], [employer]. Care instructions adapted under license by [employer] (which disclaims liability or warranty for this information). If you have questions about a medical condition or this instruction, always ask your healthcare professional. [address] any warranty or liability for your use of this information. Please be advised there is a $25 fee for all paperwork to be completed from our  providers. This is to be paid by the patient prior to picking up the completed forms.

## 2018-05-17 NOTE — MR AVS SNAPSHOT
[address] [facility], 
[facility], [address] [facility] 
[phone] Patient: Ludwin Mcdonnell MRN: [id number] :1950 Visit Information Date & Time Provider Department Dept. Phone Encounter #  
 2018  8:00 AM Sabrina Sandoval MD Neurology [facility] at [facility] [phone] [id number] Follow-up Instructions Return in about 3 months (around 2018). Your Appointments 2018  9:00 AM  
PROCEDURE with DOPPLER_NEUMR Neurology Clinic at [address]) Appt Note: doppler after tas visit 200 [facility], 
[address], [address] [address]  
[address] [facility], [address] [place] [address] Avenue, [address] [address]  
  
    
 2018  8:50 AM  
Follow Up with Gertrude Ignacio MD  
[facility] [address] [place] [address]) Appt Note: 6 month f/u Thyroid [address] [phone] [address] [place] [address] 2019  9:00 AM  
Follow Up with Sabrina Sandoval MD  
Neurology Clinic at [address]) Appt Note: f/u memory loss, jrb 18  
 200 [facility], 
[address] [facility], [address] [address]  
[address] [facility], [address] [place] [address] Avenue, [address] [address] Upcoming Health Maintenance Date Due Hepatitis C Screening 1950 FOOT EXAM Q1 10/22/1962 MICROALBUMIN Q1 10/22/1962 EYE EXAM RETINAL OR DILATED Q1 10/22/1962 DTaP/Tdap/Td series (1 - Tdap) 10/22/1973 FOBT Q 1 YEAR AGE 50-75 10/22/2002 ZOSTER VACCINE AGE 60> 2012 HEMOGLOBIN A1C Q6M 2017 LIPID PANEL Q1 10/12/2017 GLAUCOMA SCREENING Q2Y 10/22/2017 Bone Densitometry (Dexa) Screening 10/22/2017 Pneumococcal 65+ High/Highest Risk (1 of 2 - PCV13) 10/22/2017 MEDICARE YEARLY EXAM 3/14/2018 BREAST CANCER SCRN MAMMOGRAM 4/29/2018 Influenza Age 5 to Adult 8/1/2018 Allergies as of 5/17/2018  Review Complete On: 5/17/2018 By: Yung Berman MD  
  
 Severity Noted Reaction Type Reactions Morphine High 03/25/2016   Topical Itching Pt said she had severe itching after Knee replacement Fiorinal [Butalbital-aspirin-caffeine]  02/23/2012    Other (comments) Pt does not remeber Current Immunizations  Reviewed on 3/7/2012 Name Date Influenza Vaccine Split 11/7/2011 Not reviewed this visit You Were Diagnosed With   
  
 Codes Comments Amnesia memory loss    -  Primary ICD-10-CM: R41.3 ICD-9-CM: 780.93 Disturbance of memory     ICD-10-CM: R41.3 ICD-9-CM: 780.93 Blind right eye     ICD-10-CM: H54.40 ICD-9-CM: 369.60 Meningioma ([address]     ICD-10-CM: D32.9 ICD-9-CM: 225.2 Bilateral carotid artery stenosis     ICD-10-CM: I65.23 ICD-9-CM: 433.10, 433.30 Vitamin D deficiency     ICD-10-CM: E55.9 ICD-9-CM: 268.9 B12 deficiency     ICD-10-CM: E53.8 ICD-9-CM: 266.2 Vitals BP Pulse Resp Height(growth percentile) Weight(growth percentile) SpO2  
 142/80 68 16 5' 5\" (1.651 m) 232 lb 9.6 oz (105.5 kg) 98% BMI OB Status Smoking Status 38.71 kg/m2 Postmenopausal Former Smoker Vitals History BMI and BSA Data Body Mass Index Body Surface Area 38.71 kg/m 2 2.2 m 2 Preferred Pharmacy Pharmacy Name Phone [facility] [address] [facility] [phone] Your Updated Medication List  
  
   
This list is accurate as of 5/17/18  8:54 AM.  Always use your most recent med list.  
  
  
  
  
 aspirin, buffered 81 mg Tab Take 1 Tab by mouth daily. levothyroxine 150 mcg tablet Commonly known as:  SYNTHROID Take 1 Tab by mouth Daily (before breakfast). lovastatin 20 mg tablet Commonly known as:  MEVACOR Take 20 mg by mouth nightly. meloxicam 15 mg tablet Commonly known as:  MOBIC Take 15 mg by mouth daily. metFORMIN 1,000 mg Tg24 24 hour tablet Commonly known as:  [place] Take  by mouth two (2) times a day. VITAMIN B-12 1,000 mcg tablet Generic drug:  cyanocobalamin Take 1,000 mcg by mouth daily. VITAMIN D2 50,000 unit capsule Generic drug:  ergocalciferol Take 50,000 Units by mouth. We Performed the Following MATT COMPREHENSIVE PLUS PANEL [[id number] Custom] CBC WITH AUTOMATED DIFF [93388 CPT(R)] REFERRAL TO PSYCHOLOGY [IDI63 Custom] Comments:  
 memory loss needs neuro psych testing SED RATE (ESR) [id number] CPT(R)] VITAMIN B12 & FOLATE [41956 CPT(R)] VITAMIN D, 25 HYROXY PANEL [[id number] Custom] Follow-up Instructions Return in about 3 months (around 8/17/2018). To-Do List   
 05/17/2018 Imaging:  DUPLEX CAROTID BILATERAL AMB NEURO   
  
 05/23/2018 Imaging:  MRI BRAIN W WO CONT Referral Information Referral ID Referred By Referred To  
  
 [id number] [address] [facility], [address] [facility] Phone: [phone] Fax: [phone] Visits Status Start Date End Date 1 New Request 5/17/18 5/17/19 If your referral has a status of pending review or denied, additional information will be sent to support the outcome of this decision. Patient Instructions A Healthy Lifestyle: Care Instructions Your Care Instructions A healthy lifestyle can help you feel good, stay at a healthy weight, and have plenty of energy for both work and play. A healthy lifestyle is something you can share with your whole family.  
A healthy lifestyle also can lower your risk for serious health problems, such as high blood pressure, heart disease, and diabetes. You can follow a few steps listed below to improve your health and the health of your family. Follow-up care is a key part of your treatment and safety. Be sure to make and go to all appointments, and call your doctor if you are having problems. It's also a good idea to know your test results and keep a list of the medicines you take. How can you care for yourself at home? · Do not eat too much sugar, fat, or fast foods. You can still have dessert and treats now and then. The goal is moderation. · Start small to improve your eating habits. Pay attention to portion sizes, drink less juice and soda pop, and eat more fruits and vegetables. ¨ Eat a healthy amount of food. A 3-ounce serving of meat, for example, is about the size of a deck of cards. Fill the rest of your plate with vegetables and whole grains. ¨ Limit the amount of soda and sports drinks you have every day. Drink more water when you are thirsty. ¨ Eat at least 5 servings of fruits and vegetables every day. It may seem like a lot, but it is not hard to reach this goal. A serving or helping is 1 piece of fruit, 1 cup of vegetables, or 2 cups of leafy, raw vegetables. Have an apple or some carrot sticks as an afternoon snack instead of a candy bar. Try to have fruits and/or vegetables at every meal. 
· Make exercise part of your daily routine. You may want to start with simple activities, such as walking, bicycling, or slow swimming. Try to be active 30 to 60 minutes every day. You do not need to do all 30 to 60 minutes all at once. For example, you can exercise 3 times a day for 10 or 20 minutes. Moderate exercise is safe for most people, but it is always a good idea to talk to your doctor before starting an exercise program. 
· Keep moving. Bhavesh Em the lawn, work in the garden, or [place]. Take the stairs instead of the elevator at work. · If you smoke, quit. People who smoke have an increased risk for heart attack, stroke, cancer, and other lung illnesses. Quitting is hard, but there are ways to boost your chance of quitting tobacco for good. ¨ Use nicotine gum, patches, or lozenges. ¨ Ask your doctor about stop-smoking programs and medicines. ¨ Keep trying. In addition to reducing your risk of diseases in the future, you will notice some benefits soon after you stop using tobacco. If you have shortness of breath or asthma symptoms, they will likely get better within a few weeks after you quit. · Limit how much alcohol you drink. Moderate amounts of alcohol (up to 2 drinks a day for men, 1 drink a day for women) are okay. But drinking too much can lead to liver problems, high blood pressure, and other health problems. Family health If you have a family, there are many things you can do together to improve your health. · Eat meals together as a family as often as possible. · Eat healthy foods. This includes fruits, vegetables, lean meats and dairy, and whole grains. · Include your family in your fitness plan. Most people think of activities such as jogging or tennis as the way to fitness, but there are many ways you and your family can be more active. Anything that makes you breathe hard and gets your heart pumping is exercise. Here are some tips: 
¨ Walk to do errands or to take your child to school or the bus. ¨ Go for a family bike ride after dinner instead of watching TV. Where can you learn more? Go to http://zach-cesar.info/. Enter U396 in the search box to learn more about \"A Healthy Lifestyle: Care Instructions. \" Current as of: May 12, 2017 Content Version: 11.4 © 9693-4030 [employer]. Care instructions adapted under license by [employer] (which disclaims liability or warranty for this information).  If you have questions about a medical condition or this instruction, always ask your healthcare professional. [address]  any warranty or liability for your use of this information. Please be advised there is a $25 fee for all paperwork to be completed from our  providers. This is to be paid by the patient prior to picking up the completed forms. Introducing [facility] HE[facility]! [employer] introduces Tinybop patient portal. Now you can access parts of your medical record, email your doctor's office, and request medication refills online. 1. In your internet browser, go to [email or web address] 2. Click on the First Time User? Click Here link in the Sign In box. You will see the New Member Sign Up page. 3. Enter your Tinybop Access Code exactly as it appears below. You will not need to use this code after youve completed the sign-up process. If you do not sign up before the expiration date, you must request a new code. · Tinybop Access Code: [email or web address] Expires: 5/23/2018  4:45 PM 
 
4. Enter the last four digits of your Social Security Number (xxxx) and Date of Birth (mm/dd/yyyy) as indicated and click Submit. You will be taken to the next sign-up page. 5. Create a Tinybop ID. This will be your Tinybop login ID and cannot be changed, so think of one that is secure and easy to remember. 6. Create a Tinybop password. You can change your password at any time. 7. Enter your Password Reset Question and Answer. This can be used at a later time if you forget your password. 8. Enter your e-mail address. You will receive e-mail notification when new information is available in [address] 9. Click Sign Up. You can now view and download portions of your medical record. 10. Click the Download Summary menu link to download a portable copy of your medical information.  
 
If you have questions, please visit the Frequently Asked Questions section of the [employer]. Remember, [employer] is NOT to be used for urgent needs. For medical emergencies, dial 911. Now available from your iPhone and Android! Please provide this summary of care documentation to your next provider. Your primary care clinician is listed as Terrie Kurtz. If you have any questions after today's visit, please call [phone].

## 2018-05-18 NOTE — PROGRESS NOTES
Consult  REFERRED BY:  Justice Weaver MD    CHIEF COMPLAINT: Memory loss      Subjective:     Arleth Lemus is a 74 y.o. right-handed  female seen today as a new patient at the request of Dr. Dorinda Gonzales for evaluation of new problem of progressive memory loss that is occurring over the last year or 2 by the patient's history and by the history of her daughter-in-law. The patient has no other family history of similar problems. She has not had any precipitating event, as far as fever, trauma, headaches, meningismus, unusual stress tension or depression, or medication exposure or toxin exposure. She has had no workup for this. She has had no focal weakness, no sensory loss, and no other focal neurologic deficit recently. She apparently does have loss of vision in her right eye of unclear etiology, and had been followed by Dr. Brittany Morales at the [facility] for a while but has not seen him in 2 years, and has no idea why she lost the vision in her eye, and also carries a diagnosis of meningioma of the brain on her chart and she knows nothing about that either. We will asked the patient to get an MRI scan of the brain to further evaluate these problems, and rule out treatable causes of her dementia. We will also send off all metabolic parameters looking for treatable causes of her memory loss. We will send her for neuropsych testing, and she clearly will need cognitive enhancing agents. She does have a college education and works as a  in the past and remains physically and mentally active. 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     Past Medical History:   Diagnosis Date    Arthritis     left knee    Autoimmune disease (HCC)     Derik Romberg Syndrome    Chronic pain     left knee    Diabetes ([facility] [address]     Ill-defined condition     Derik Romberg Syndrome    Nausea & vomiting     Neuro-degenerative disorders     Derik-Romberg syndrome    Other ill-defined conditions(999.93)     trigeminal neuralgia,blind right eye    PUD (peptic ulcer disease)     Seizures (HCC)     as a child after fall    Thyroid disease     treated / no longer treated      Past Surgical History:   Procedure Laterality Date    HX  SECTION  0607/9798    HX KNEE ARTHROSCOPY      bilater arthroscopic knee surgery    HX OPEN CHOLECYSTECTOMY      HX ORTHOPAEDIC      left rotator cuff repair, shoulder, elbow    HX THYROIDECTOMY  16    Total thyroidectomy, [facility] Charan Carlos @ [facility]     HX  [facility]    right optic nerve meningioma removed     NEUROLOGICAL PROCEDURE UNLISTED      gamma knife    TOTAL KNEE ARTHROPLASTY      left     Family History   Problem Relation Age of Onset    Cancer Mother      breast    Dementia Mother     Stroke Father     Stroke Maternal Grandfather     No Known Problems Sister     No Known Problems Brother     No Known Problems Sister     No Known Problems Brother     No Known Problems Brother       Social History   Substance Use Topics    Smoking status: Former Smoker    Smokeless tobacco: Never Used    Alcohol use No         Current Outpatient Prescriptions:     meloxicam (MOBIC) 15 mg tablet, Take 15 mg by mouth daily. , Disp: , Rfl:     cyanocobalamin (VITAMIN B-12) 1,000 mcg tablet, Take 1,000 mcg by mouth daily. , Disp: , Rfl:     ergocalciferol (VITAMIN D2) 50,000 unit capsule, Take 50,000 Units by mouth., Disp: , Rfl:     levothyroxine (SYNTHROID) 150 mcg tablet, Take 1 Tab by mouth Daily (before breakfast). , Disp: 30 Tab, Rfl: 5    Aspirin, Buffered 81 mg tab, Take 1 Tab by mouth daily. , Disp: , Rfl:     lovastatin (MEVACOR) 20 mg tablet, Take 20 mg by mouth nightly., Disp: , Rfl:     metFORMIN (GLUMETZA) 1,000 mg TG24 24 hour tablet, Take  by mouth two (2) times a day., Disp: , Rfl: Allergies   Allergen Reactions    Morphine Itching     Pt said she had severe itching after Knee replacement    Fiorinal [Butalbital-Aspirin-Caffeine] Other (comments)     Pt does not remeber        Review of Systems:  A comprehensive review of systems was negative except for: Eyes: positive for visual disturbance  Ears, nose, mouth, throat, and face: positive for hearing loss and tinnitus  Musculoskeletal: positive for myalgias, arthralgias and stiff joints  Neurological: positive for memory problems and Memory loss  Behvioral/Psych: positive for Memory loss, anxiety and depression   Vitals:    05/17/18 0754   BP: 142/80   Pulse: 68   Resp: 16   SpO2: 98%   Weight: 232 lb 9.6 oz (105.5 kg)   Height: 5' 5\" (1.651 m)     Objective:     I      NEUROLOGICAL EXAM:    Appearance: The patient is well developed, well nourished, provides a fair history and is in no acute distress. Mental Status: Oriented to time, place and person, and the president, patient cannot remember 1 of 3 words at 30 seconds with distraction, she cannot do serial sevens real well, could spell world backward, and had a little difficulty driving a clock that shows a time 10 minutes 11, cognitive function is abnormal and speech is fluent and no aphasia or dysarthria. Mood and affect appropriate. Cranial Nerves:   Intact visual fields. Fundi are benign but poorly seen on the left, and the right is scarred and cannot visualize the fundus and she is blind in that eye. RADHA, EOM's full, no nystagmus, no ptosis. Facial sensation is normal. Corneal reflexes are not tested. Facial movement is asymmetric and she has had my atrophy of the right side of her face. Hearing is abnormal bilaterally. Palate is midline with normal sternocleidomastoid and trapezius muscles are normal. Tongue is midline. Neck without meningismus or bruits  Temporal arteries are not tender or enlarged   Motor:  5/5 strength in upper and lower proximal and distal muscles. Normal bulk and tone. No fasciculations. Reflexes:   Deep tendon reflexes 1+/4 and symmetrical.  No babinski or clonus present   Sensory:   Normal to touch, pinprick and vibration. DSS is intact   Gait:  Normal gait for her age. Tremor:   No tremor noted. Cerebellar:  No cerebellar signs present. Neurovascular:  Normal heart sounds and regular rhythm, peripheral pulses decreased, and no carotid bruits. Assessment:       ICD-10-CM ICD-9-CM    1. Amnesia memory loss R41.3 780.93 meloxicam (MOBIC) 15 mg tablet      cyanocobalamin (VITAMIN B-12) 1,000 mcg tablet      ergocalciferol (VITAMIN D2) 50,000 unit capsule      MATT COMPREHENSIVE PLUS PANEL      CBC WITH AUTOMATED DIFF      SED RATE (ESR)      VITAMIN D, 25 HYROXY PANEL      VITAMIN B12 & FOLATE      DUPLEX CAROTID BILATERAL AMB NEURO      MRI BRAIN W WO CONT      REFERRAL TO PSYCHOLOGY   2. Disturbance of memory R41.3 780.93 meloxicam (MOBIC) 15 mg tablet      cyanocobalamin (VITAMIN B-12) 1,000 mcg tablet      ergocalciferol (VITAMIN D2) 50,000 unit capsule      MATT COMPREHENSIVE PLUS PANEL      CBC WITH AUTOMATED DIFF      SED RATE (ESR)      VITAMIN D, 25 HYROXY PANEL      VITAMIN B12 & FOLATE      DUPLEX CAROTID BILATERAL AMB NEURO      MRI BRAIN W WO CONT      REFERRAL TO PSYCHOLOGY   3. Blind right eye H54.40 369.60 meloxicam (MOBIC) 15 mg tablet      cyanocobalamin (VITAMIN B-12) 1,000 mcg tablet      ergocalciferol (VITAMIN D2) 50,000 unit capsule      MATT COMPREHENSIVE PLUS PANEL      CBC WITH AUTOMATED DIFF      SED RATE (ESR)      VITAMIN D, 25 HYROXY PANEL      VITAMIN B12 & FOLATE      DUPLEX CAROTID BILATERAL AMB NEURO      MRI BRAIN W WO CONT      REFERRAL TO PSYCHOLOGY   4.  Meningioma (HCC) D32.9 225.2 meloxicam (MOBIC) 15 mg tablet      cyanocobalamin (VITAMIN B-12) 1,000 mcg tablet      ergocalciferol (VITAMIN D2) 50,000 unit capsule      MATT COMPREHENSIVE PLUS PANEL      CBC WITH AUTOMATED DIFF      SED RATE (ESR) VITAMIN D, 25 HYROXY PANEL      VITAMIN B12 & FOLATE      DUPLEX CAROTID BILATERAL AMB NEURO      MRI BRAIN W WO CONT      REFERRAL TO PSYCHOLOGY   5. Bilateral carotid artery stenosis I65.23 433.10 meloxicam (MOBIC) 15 mg tablet     433.30 cyanocobalamin (VITAMIN B-12) 1,000 mcg tablet      ergocalciferol (VITAMIN D2) 50,000 unit capsule      MATT COMPREHENSIVE PLUS PANEL      CBC WITH AUTOMATED DIFF      SED RATE (ESR)      VITAMIN D, 25 HYROXY PANEL      VITAMIN B12 & FOLATE      DUPLEX CAROTID BILATERAL AMB NEURO      MRI BRAIN W WO CONT      REFERRAL TO PSYCHOLOGY   6. Vitamin D deficiency E55.9 268.9 meloxicam (MOBIC) 15 mg tablet      cyanocobalamin (VITAMIN B-12) 1,000 mcg tablet      ergocalciferol (VITAMIN D2) 50,000 unit capsule      MATT COMPREHENSIVE PLUS PANEL      CBC WITH AUTOMATED DIFF      SED RATE (ESR)      VITAMIN D, 25 HYROXY PANEL      VITAMIN B12 & FOLATE      DUPLEX CAROTID BILATERAL AMB NEURO      MRI BRAIN W WO CONT      REFERRAL TO PSYCHOLOGY   7. B12 deficiency E53.8 266.2 meloxicam (MOBIC) 15 mg tablet      cyanocobalamin (VITAMIN B-12) 1,000 mcg tablet      ergocalciferol (VITAMIN D2) 50,000 unit capsule      MATT COMPREHENSIVE PLUS PANEL      CBC WITH AUTOMATED DIFF      SED RATE (ESR)      VITAMIN D, 25 HYROXY PANEL      VITAMIN B12 & FOLATE      DUPLEX CAROTID BILATERAL AMB NEURO      MRI BRAIN W WO CONT      REFERRAL TO PSYCHOLOGY     Active Problems:    * No active hospital problems. *      Plan:     Patient has amnestic dementia, most consistent with Alzheimer's disease, but has a history of meningioma, visual loss in her eye, and some type of unusual amyotrophy syndrome that all may be contributing some.   We will get an MRI scan, and look for all metabolic parameters that are treatable causes of dementia  To be sent for neuropsych testing  We will try to get her test results from Dr. Nohelia Chang and [facility]  Her carotid Doppler studies showed no significant disease today  She will call for results of her test, we will start cognitive enhancing agents once her testing is done  They will check my chart or call us for results. An hour spent with the patient, going over her history, using her chart in detail, and discussing her diagnosis prognosis and further treatment evaluation. Signed By: Warren Nunn MD     May 17, 2018       CC: Bev Izquierdo MD  FAX: [phone]    This note will not be viewable in [address]

## 2018-05-22 LAB
25(OH)D2 SERPL-MCNC: 17 NG/ML
25(OH)D3 SERPL-MCNC: 15 NG/ML
25(OH)D3+25(OH)D2 SERPL-MCNC: 32 NG/ML
BASOPHILS # BLD AUTO: 0 X10E3/UL (ref 0–0.2)
BASOPHILS NFR BLD AUTO: 1 %
CENTROMERE B AB SER-ACNC: <0.2 AI (ref 0–0.9)
CHROMATIN AB SERPL-ACNC: <0.2 AI (ref 0–0.9)
DSDNA AB SER-ACNC: <1 IU/ML (ref 0–9)
ENA JO1 AB SER-ACNC: <0.2 AI (ref 0–0.9)
ENA RNP AB SER-ACNC: <0.2 AI (ref 0–0.9)
ENA SCL70 AB SER-ACNC: <0.2 AI (ref 0–0.9)
ENA SM AB SER-ACNC: <0.2 AI (ref 0–0.9)
ENA SM+RNP AB SER-ACNC: <0.2 AI (ref 0–0.9)
ENA SS-A AB SER-ACNC: <0.2 AI (ref 0–0.9)
ENA SS-B AB SER-ACNC: <0.2 AI (ref 0–0.9)
EOSINOPHIL # BLD AUTO: 0.1 X10E3/UL (ref 0–0.4)
EOSINOPHIL NFR BLD AUTO: 1 %
ERYTHROCYTE [DISTWIDTH] IN BLOOD BY AUTOMATED COUNT: 15.3 % (ref 12.3–15.4)
ERYTHROCYTE [SEDIMENTATION RATE] IN BLOOD BY WESTERGREN METHOD: 9 MM/HR (ref 0–40)
FOLATE SERPL-MCNC: 10.5 NG/ML
HCT VFR BLD AUTO: 44.8 % (ref 34–46.6)
HGB BLD-MCNC: 14.7 G/DL (ref 11.1–15.9)
IMM GRANULOCYTES # BLD: 0 X10E3/UL (ref 0–0.1)
IMM GRANULOCYTES NFR BLD: 0 %
LYMPHOCYTES # BLD AUTO: 1.5 X10E3/UL (ref 0.7–3.1)
LYMPHOCYTES NFR BLD AUTO: 24 %
MCH RBC QN AUTO: 29.4 PG (ref 26.6–33)
MCHC RBC AUTO-ENTMCNC: 32.8 G/DL (ref 31.5–35.7)
MCV RBC AUTO: 90 FL (ref 79–97)
MONOCYTES # BLD AUTO: 0.5 X10E3/UL (ref 0.1–0.9)
MONOCYTES NFR BLD AUTO: 7 %
NEUTROPHILS # BLD AUTO: 4.2 X10E3/UL (ref 1.4–7)
NEUTROPHILS NFR BLD AUTO: 67 %
PLATELET # BLD AUTO: 199 X10E3/UL (ref 150–379)
RBC # BLD AUTO: 5 X10E6/UL (ref 3.77–5.28)
RIBOSOMAL P AB SER-ACNC: <0.2 AI (ref 0–0.9)
SEE BELOW:, 164879: NORMAL
VIT B12 SERPL-MCNC: 711 PG/ML (ref 232–1245)
WBC # BLD AUTO: 6.3 X10E3/UL (ref 3.4–10.8)

## 2018-07-19 ENCOUNTER — TELEPHONE (OUTPATIENT)
Dept: ENDOCRINOLOGY | Age: 68
End: 2018-07-19

## 2018-07-19 NOTE — TELEPHONE ENCOUNTER
Received TSH from Dr. Mariel Louis. Her TSH was 0.05. I called and spoke with pt. She notes that she is taking LT4 150mcg daily. I instructed her to decrease to 150mcg Mon-Sat and nothing on Sun. Pt voices understanding and agreement with the plan.

## 2018-08-22 ENCOUNTER — OFFICE VISIT (OUTPATIENT)
Dept: ENDOCRINOLOGY | Age: 68
End: 2018-08-22

## 2018-08-22 VITALS
SYSTOLIC BLOOD PRESSURE: 159 MMHG | HEIGHT: 65 IN | DIASTOLIC BLOOD PRESSURE: 73 MMHG | BODY MASS INDEX: 38.02 KG/M2 | WEIGHT: 228.2 LBS | HEART RATE: 61 BPM

## 2018-08-22 DIAGNOSIS — E89.0 POSTSURGICAL HYPOTHYROIDISM: ICD-10-CM

## 2018-08-22 DIAGNOSIS — C73 PAPILLARY CARCINOMA OF THYROID (HCC): Primary | ICD-10-CM

## 2018-08-22 RX ORDER — DILTIAZEM HYDROCHLORIDE 120 MG/1
120 CAPSULE, COATED, EXTENDED RELEASE ORAL
COMMUNITY
Start: 2018-08-20 | End: 2019-08-20

## 2018-08-22 RX ORDER — WARFARIN SODIUM 5 MG/1
5 TABLET ORAL EVERY EVENING
COMMUNITY
End: 2020-02-24

## 2018-08-22 NOTE — MR AVS SNAPSHOT
[address] [place] [address] [address] [facility] [address] [address] [phone] Patient: Ayo Méndez MRN: [id number] Visit Information Date & Time Provider Department Dept. Phone Encounter #  
 2018  8:50 AM Joanna Ma, [address] [place] [address] Diabetes and Endocrinology [phone] [id number] Follow-up Instructions Return in about 6 months (around 2019). Your Appointments 2018  8:50 AM  
Follow Up with MD Sonu Bakerisington Diabetes and Endocrinology [facility]) Appt Note: 6 month f/u Thyroid One [facility] [phone] [address] [place] [address] 2019  9:00 AM  
Follow Up with Juan Pepe MD  
Ne[facility] at [facility]) Appt Note: f/u memory loss, jrb 18  
 [address] [place] [address], 
[address] [place] [address], [address] [address]  
[address], [address] [place] [address], [address] [address]  
  
    
 2019 11:20 AM  
New Patient with Alona Neely PsyD  [facility] ([facility]) Appt Note: new patient consult/ Arden Regina [place] [address] [facility] [address] [address] [phone]  
  
   
 [place] [address] [address] [address] [place] 2019  1:00 PM  
Any with Alona Neely PsyD  [facility] ([facility]) Appt Note: testing/ Arden Linda [place] [address] [facility] [address] [address] [phone]  
  
   
 [place] [address] [address] [address] [place] Upcoming Health Maintenance Date Due Hepatitis C Screening 1950 FOOT EXAM Q1 10/22/1962 MICROALBUMIN Q1 10/22/1962 EYE EXAM RETINAL OR DILATED Q1 10/22/1962 DTaP/Tdap/Td series (1 - Tdap) 10/22/1973 FOBT Q 1 YEAR AGE 50-75 10/22/2002 ZOSTER VACCINE AGE 60> 8/22/2012 HEMOGLOBIN A1C Q6M 4/12/2017 LIPID PANEL Q1 10/12/2017 GLAUCOMA SCREENING Q2Y 10/22/2017 Bone Densitometry (Dexa) Screening 10/22/2017 Pneumococcal 65+ High/Highest Risk (1 of 2 - PCV13) 10/22/2017 MEDICARE YEARLY EXAM 3/14/2018 BREAST CANCER SCRN MAMMOGRAM 4/29/2018 Influenza Age 5 to Adult 8/1/2018 Allergies as of 8/22/2018  Review Complete On: 8/22/2018 By: Kylie Washington MD  
  
 Severity Noted Reaction Type Reactions Morphine High 03/25/2016   Topical Itching Pt said she had severe itching after Knee replacement Fiorinal [Butalbital-aspirin-caffeine]  02/23/2012    Other (comments) Pt does not remeber Current Immunizations  Reviewed on 3/7/2012 Name Date Influenza Vaccine Split 11/7/2011 Not reviewed this visit You Were Diagnosed With   
  
 Codes Comments Papillary carcinoma of thyroid ([facility] [address]    -  Primary ICD-10-CM: B17 ICD-9-CM: 594 Postsurgical hypothyroidism     ICD-10-CM: E89.0 ICD-9-CM: 244.0 Vitals BP Pulse Height(growth percentile) Weight(growth percentile) BMI OB Status 159/73 (BP 1 Location: Right arm, BP Patient Position: Sitting) 61 5' 5\" (1.651 m) 228 lb 3.2 oz (103.5 kg) 37.97 kg/m2 Postmenopausal  
 Smoking Status Former Smoker Vitals History BMI and BSA Data Body Mass Index Body Surface Area  
 37.97 kg/m 2 2.18 m 2 Preferred Pharmacy Pharmacy Name Phone [facility] [address] [facility] [phone] Your Updated Medication List  
  
   
This list is accurate as of 8/22/18  8:43 AM.  Always use your most recent med list.  
  
  
  
  
 aspirin, buffered 81 mg Tab Take 1 Tab by mouth daily. COUMADIN 5 mg tablet Generic drug:  warfarin Take 5 mg by mouth every evening. dilTIAZem  mg ER capsule Commonly known as:  CARDIZEM CD Take 120 mg by mouth.  
  
 levothyroxine 150 mcg tablet Commonly known as:  SYNTHROID Take 1 Tab by mouth Daily (before breakfast). lovastatin 20 mg tablet Commonly known as:  MEVACOR Take 20 mg by mouth nightly. meloxicam 15 mg tablet Commonly known as:  MOBIC Take 15 mg by mouth daily. metFORMIN 1,000 mg Tg24 24 hour tablet Commonly known as:  [address] Take  by mouth two (2) times a day. VITAMIN B-12 1,000 mcg tablet Generic drug:  cyanocobalamin Take 1,000 mcg by mouth daily. VITAMIN D2 50,000 unit capsule Generic drug:  ergocalciferol Take 50,000 Units by mouth every seven (7) days. We Performed the Following COLLECTION VENOUS BLOOD,VENIPUNCTURE A0845436 CPT(R)] MS HANDLG&/OR CONVEY OF SPEC FOR TR OFFICE TO LAB [65092 CPT(R)] T4, FREE F028965 CPT(R)] THYROGLOBULIN REFLEX PROFILE D8688325 CPT(R)] TSH 3RD GENERATION [61606 CPT(R)] Follow-up Instructions Return in about 6 months (around 2/22/2019). Introducing [facility]! Beatriz Foley introduces UpTo patient portal. Now you can access parts of your medical record, email your doctor's office, and request medication refills online. 1. In your internet browser, go to [email or web address] 2. Click on the First Time User? Click Here link in the Sign In box. You will see the New Member Sign Up page. 3. Enter your UpTo Access Code exactly as it appears below. You will not need to use this code after youve completed the sign-up process. If you do not sign up before the expiration date, you must request a new code. · UpTo Access Code: [email or web address] Expires: 8/23/2018  7:17 AM 
 
4. Enter the last four digits of your Social Security Number (xxxx) and Date of Birth (mm/dd/yyyy) as indicated and click Submit. You will be taken to the next sign-up page. 5. Create a Lively ID. This will be your Lively login ID and cannot be changed, so think of one that is secure and easy to remember. 6. Create a Lively password. You can change your password at any time. 7. Enter your Password Reset Question and Answer. This can be used at a later time if you forget your password. 8. Enter your e-mail address. You will receive e-mail notification when new information is available in [address] 9. Click Sign Up. You can now view and download portions of your medical record. 10. Click the Download Summary menu link to download a portable copy of your medical information. If you have questions, please visit the Frequently Asked Questions section of the Lively website. Remember, Lively is NOT to be used for urgent needs. For medical emergencies, dial 911. Now available from your iPhone and Android! Please provide this summary of care documentation to your next provider. Your primary care clinician is listed as Hemanth Martinez. If you have any questions after today's visit, please call [phone].

## 2018-08-22 NOTE — PROGRESS NOTES
Chief Complaint   Patient presents with    Thyroid Problem     pcp and pharmacy verified    Diabetes   Records since last visit reviewed  History of Present Illness: Ady Sanders is a 74 y.o. female here for follow up of thyroid cancer and post-surgical hypothyroidism. Pt notes that it all started in January 2016. She was seeing Dr. Yesica Quintero for issues of multiple ear infections. Dr. Yesica Quintero noted a thyroid nodule. She was sent for an FNA, which came back positive for papillary cancer. Pt was taken to the OR on 4/15/16, her surgical pathology showed multifocal papillary thyroid cancer the largest being 4.5cm in size, no tumor capsule, no evidence of extra thyroidal extension. Pt was sent for TSH stimulated MURRAY, she received 106 mCi of I-131 on 6/24/16. The one week post WBS showed uptake in the thyroid bed only. After MURRAY she was placed back on LT4 175mcg daily. Prior to MURRAY her TgAb was 137.9. At her last visit in February 2018 she had been recently diagnosed with Afib, though she was clinically euthyroid on LT4 150mcg daily. I did a 4 week levothyroxine withdrawal followed by a WBS I-123, which did not show any evidence of activity to suggest recurrence or metastasis. Her Tg was negative but her TgAb was 464. I had pt restart her LT4 150mcg daily. She did not have repeat TFTs drawn till July 19th and her TSH was 0.05. I instructed her to decrease her LT4 dose to 150mcg Mon-Sat only. Pt notes she has been having issues of knee pains and \"it is trying to pop out of joint occasionally. Pt has lost 4 pounds since our last visit. Pt still has blindness in the right side (trigeminal nerve issues), but it is not progressing. Pt is taking her LT4 150mcg Mon-Sat and not on Sunday. She is not having issues of palpitations and \"my heart has calmed down\". She follows with Dr. Lubna Espinal of Cardiology, who she sees tomorrow in follow up. She is still taking her Diltazam and Coumadin.     She denies issues of tremors, SOB, CP, diarrhea or heat or cold intolerance. She denies issues of dysphagia, dysphonia, chocking sensations, or hoarseness in her voice. Pt is followed by [facility]er[facility]th[facility] at [facility], Dr. Kacy Arzate, for trigeminal Neuralgia. 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 Current Outpatient Prescriptions   Medication Sig    dilTIAZem CD (CARDIZEM CD) 120 mg ER capsule Take 120 mg by mouth.  warfarin (COUMADIN) 5 mg tablet Take 5 mg by mouth every evening.  meloxicam (MOBIC) 15 mg tablet Take 15 mg by mouth daily.  cyanocobalamin (VITAMIN B-12) 1,000 mcg tablet Take 1,000 mcg by mouth daily.  ergocalciferol (VITAMIN D2) 50,000 unit capsule Take 50,000 Units by mouth every seven (7) days.  levothyroxine (SYNTHROID) 150 mcg tablet Take 1 Tab by mouth Daily (before breakfast).  Aspirin, Buffered 81 mg tab Take 1 Tab by mouth daily.  lovastatin (MEVACOR) 20 mg tablet Take 20 mg by mouth nightly.  metFORMIN (GLUMETZA) 1,000 mg TG24 24 hour tablet Take  by mouth two (2) times a day. No current facility-administered medications for this visit. Allergies   Allergen Reactions    Morphine Itching     Pt said she had severe itching after Knee replacement    Fiorinal [Butalbital-Aspirin-Caffeine] Other (comments)     Pt does not remeber     Review of Systems:  - Cardiovascular: no chest pain  - Neurological: no tremors  - Integumentary: skin is normal    Physical Examination:  Blood pressure 159/73, pulse 61, height 5' 5\" (1.651 m), weight 228 lb 3.2 oz (103.5 kg).   - General: pleasant, no distress, good eye contact   - Neck: no masses or LAD palpated  - Cardiovascular: regular, normal rate, nl s1 and s2, no m/r/g   - Integumentary: skin is normal, no edema  - Neurological: reflexes 2+ at biceps, no tremors,   - Psychiatric: normal mood and affect    Data Reviewed:   EXAM:  NM THYROID METS WH BODY     INDICATION: Thyroid cancer.     COMPARISON: 2016     CORRELATIVE IMAGING STUDIES:  7/1/2016     TRACER: Iodine 131.     PROCEDURE:  July 1[birth year] 1931 body scan was performed following ingestion of 10 mCi I-131 by  mouth.     Physiologic activity is noted. No abnormal activity is identified to suggest  recurrent or metastatic disease.     IMPRESSION  IMPRESSION: No definite evidence of recurrent or metastatic disease. .    Assessment/Plan:   1) Thyroid cancer > Pt has no concerning features or physical findings. Will check her Tg and TgAb. Our goal TSH is 0.1-0.5    2) Post surgical hypothyroidism > Will check TFTs today and adjust her dose as needed. Pt voices understanding and agreement with the plan. Pain noted and pt was recommended to call her PCP for further evaluation and treatment, as needed      RTC 6 months      Follow-up Disposition:  Return in about 6 months (around 2/22/2019). Copy sent to:  Gildardo Cabrera and Mercy Romo

## 2018-08-27 LAB
T4 FREE SERPL-MCNC: 1.93 NG/DL (ref 0.82–1.77)
THYROGLOB AB SERPL-ACNC: 535.7 IU/ML (ref 0–0.9)
THYROGLOB SERPL-MCNC: 3.9 NG/ML
TSH SERPL DL<=0.005 MIU/L-ACNC: 0.12 UIU/ML (ref 0.45–4.5)

## 2018-08-28 NOTE — PROGRESS NOTES
Spoke with pt regarding her thyroid labs. Pt to continue the Levothyroxine 150mcg Mon-Sat only (6 pills per week). This has her TSH in the goal range of 0.1-0.5. Pt voices understanding and agreement with the plan.

## 2019-01-30 ENCOUNTER — OFFICE VISIT (OUTPATIENT)
Dept: NEUROLOGY | Age: 69
End: 2019-01-30

## 2019-01-30 DIAGNOSIS — R41.89 DIFFICULTY PROCESSING INFORMATION: ICD-10-CM

## 2019-01-30 DIAGNOSIS — G30.0 EARLY ONSET ALZHEIMER'S DEMENTIA WITHOUT BEHAVIORAL DISTURBANCE (HCC): Primary | ICD-10-CM

## 2019-01-30 DIAGNOSIS — R41.3 SHORT-TERM MEMORY LOSS: ICD-10-CM

## 2019-01-30 DIAGNOSIS — H54.40 BLIND RIGHT EYE: ICD-10-CM

## 2019-01-30 DIAGNOSIS — F02.80 EARLY ONSET ALZHEIMER'S DEMENTIA WITHOUT BEHAVIORAL DISTURBANCE (HCC): Primary | ICD-10-CM

## 2019-01-30 DIAGNOSIS — D32.9 MENINGIOMA (HCC): ICD-10-CM

## 2019-01-30 DIAGNOSIS — F43.22 ADJUSTMENT DISORDER WITH ANXIETY: ICD-10-CM

## 2019-01-30 DIAGNOSIS — G31.84 MILD COGNITIVE IMPAIRMENT: Primary | ICD-10-CM

## 2019-01-30 DIAGNOSIS — R41.9 DEFICIT IN COMPREHENSION: ICD-10-CM

## 2019-01-30 DIAGNOSIS — R47.89 WORD FINDING DIFFICULTY: ICD-10-CM

## 2019-01-30 NOTE — PROGRESS NOTES
[address] [facility],[address]  [address]   [place] [address] [place] [address] [facility]    [phone] Office   [phone] Fax      Neuropsychology    Initial Diagnostic Interview Note      Referral:  Elodia Giron MD, Tobias Khan. Liban Jade MD    Ashwin Pitt is a 79 y.o. right handed   female  who was accompanied by her daughter  to the initial clinical interview on 1/30/19. Please refer to her medical records for details pertaining to her history. Briefly, the patient reported that she completed college without history of previously diagnosed LD and/or receipt of special education services. She is a retired . She lives fairly independent in an apartment. She has a two year progressive decline in short term memory. She has no known family history of dementia. She forgets the content of conversations. Loses words and forgets names. 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 She moved to [place] in 1977. She has no known recent history of fever, trauma, headaches, or medication or toxin exposure. She retired in 2004. Slower to process information. Longer to comprehend new things. Longer to make decisions. She has no focal issues. She has loss of vision in the right eye and has a diagnosis of meningioma but has no real knowledge about this. The ventricular size and configuration are normal. Scattered foci of increased T2 signal are seen in the right matter regions bilaterally.       There is no evidence of mass, hemorrhage, acute infarct or abnormal extra-axial luid collection. Normal appearing flow-voids are present in the vertebral, basilar and carotid artery systems.  The craniocervical junction is normal.    The orbits, paranasal sinuses and mastoid air cells are unremarkable.      There is no abnormal parenchymal or meningeal enhancement.     IMPRESSION  IMPRESSION:    No acute intracranial findings. Mild changes of chronic small vessel ischemia. 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 She has an unusual amyotrophy syndrome. Doppler has been done. Tissue on and around eye reversed. Just had procedure to turn her eyelid around. Bruising on face noted today. She is driving, and has gotten lost a couple of times when going to relatively unfamiliar place. Last year she did get lost two blocks away from where she had known she had been. She was confused last year, thinking she was in [place] but was in [place]. Daughter notices that the patient cannot relay a message. Forgets the content of conversations. Had a conversation with doctor as to why procedure won't be done she had no recollection as to why. She has looked at photo albums of her children and thinks they are her siblings. Needs reminders for medications, finances. Using wrong words. Has to repeat things to her. Long term memory is fine. Problems getting worse. No changes in sense of taste or smell. She prepares her own relatively simple meals. Sleep is poor. She has never slept well. No major balance changes. HEr stress level/anxiety and frustration have decreased some. Her son has taken over the finances now. Daughter assisting with medications. She was missing bills. Daughter attends all of her doctor appointments. No counseling or psychiatrist currently. No improvement in memory, though. No prior psychiatric history. No family history of major issues in that regard. Daughter notes that the patient's mother did have dementia (Alzheimer's). She may have had cognitive testing before, perhaps a year ago. Brain tumor removed 35 years ago which was the basis for her right eye blindness.      I see a neuropsych from [facility] in the chart which I also reviewed in detail, which is in the chart.       Neuropsychological Mental Status Exam (NMSE):  Historian: Good  Praxis: No UE apraxia  R/L Orientation: Intact to self and to other  Dress: within normal limits   Weight:Obese   Appearance/Hygiene: within normal limits   Gait: within normal limits   Assistive Devices: None  Mood: within normal limits   Affect: within normal limits   Comprehension: within normal limits   Thought Process: within normal limits   Expressive Language: within normal limits   Receptive Language: within normal limits   Motor:  No cognitive or motor perseveration  ETOH: Denied  Tobacco: Denied  Illicit: Denied  SI/HI: Deied  Psychosis: Denied  Insight: Within normal limits  Judgment: Within normal limits  Other Psych:      Past Medical History:   Diagnosis Date    Arthritis     left knee    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    Chronic pain     left knee    Diabetes ([facility] [address]     Ill-defined condition     L[place]ard Josh Romberg Syndrome    Nausea & vomiting     Neuro-degenerative disorders     Derik-Romberg syndrome    Other ill-defined conditions(799.89)     trigeminal neuralgia,blind right eye    PUD (peptic ulcer disease)     Seizures (HCC)     as a child after fall    Thyroid disease     treated / no longer treated       Past Surgical History:   Procedure Laterality Date    HX  SECTION      HX KNEE ARTHROSCOPY      bilater arthroscopic knee surgery    HX OPEN CHOLECYSTECTOMY      HX ORTHOPAEDIC      left rotator cuff repair, shoulder, elbow    HX THYROIDECTOMY  16    Total thyroidectomy, Dr. Archie Santo @ [address]     [address]    right optic nerve meningioma removed     NEUROLOGICAL PROCEDURE UNLISTED      gamma knife    TOTAL KNEE ARTHROPLASTY      left       Allergies   Allergen Reactions    Morphine Itching     Pt said she had severe itching after Knee replacement    Fiorinal [Butalbital-Aspirin-Caffeine] Other (comments)     Pt does not remeber       Family History   Problem Relation Age of Onset   [facility] Cancer Mother         breast    Dementia Mother     Stroke Father     Stroke Maternal Grandfather     No Known Problems Sister     No Known Problems Brother     No Known Problems Sister     No Known Problems Brother     No Known Problems Brother        Social History     Tobacco Use    Smoking status: Former Smoker    Smokeless tobacco: Never Used   Substance Use Topics    Alcohol use: No    Drug use: No       Current Outpatient Medications   Medication Sig Dispense Refill    dilTIAZem CD (CARDIZEM CD) 120 mg ER capsule Take 120 mg by mouth.  warfarin (COUMADIN) 5 mg tablet Take 5 mg by mouth every evening.  meloxicam (MOBIC) 15 mg tablet Take 15 mg by mouth daily.  cyanocobalamin (VITAMIN B-12) 1,000 mcg tablet Take 1,000 mcg by mouth daily.  ergocalciferol (VITAMIN D2) 50,000 unit capsule Take 50,000 Units by mouth every seven (7) days.  levothyroxine (SYNTHROID) 150 mcg tablet Take 1 Tab by mouth Daily (before breakfast). 30 Tab 5    Aspirin, Buffered 81 mg tab Take 1 Tab by mouth daily.  lovastatin (MEVACOR) 20 mg tablet Take 20 mg by mouth nightly.  metFORMIN (GLUMETZA) 1,000 mg TG24 24 hour tablet Take  by mouth two (2) times a day. Plan:  Obtain authorization for testing from insurance company. Report to follow once testing, scoring, and interpretation completed. ? Organic based neurocognitive issues versus mood disorder or combination of same. ? Problems organic, functional, or both? This note will not be viewable in [address] 79year old high functioning lady with cognitive decline and unusual and rare neurologic disease. The question relates to dementia now as referred by Neurology. ? Organic based cognitive decline versus mood or combination of same? Time: 96116 x 1 NSE 60 minutes with patient and family. Also spent 35 minutes additional reviewing her neuropsych report, those scores, and neuro notes.   [address]11 x 1`

## 2019-01-31 NOTE — PROGRESS NOTES
[address] [facility],5Th [address]  [address]   [place] [address] [place] [address] [address]   [phone] Office   [phone] Fax      Neuropsychological Evaluation Report      Referral:  Vandana Baker MD, [place]. Deon Hopper MD    Aryan Robledo is a 79 y.o. right handed   female  who was accompanied by her daughter  to the initial clinical interview on 1/30/19. Please refer to her medical records for details pertaining to her history. Briefly, the patient reported that she completed college without history of previously diagnosed LD and/or receipt of special education services. She is a retired . She lives fairly independent in an apartment. She has a two year progressive decline in short term memory. She has no known family history of dementia. She forgets the content of conversations. Loses words and forgets names. 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 She moved to [place] in 1977. She has no known recent history of fever, trauma, headaches, or medication or toxin exposure. She retired in 2004. Slower to process information. Longer to comprehend new things. Longer to make decisions. She has no focal issues. She has loss of vision in the right eye and has a diagnosis of meningioma but has no real knowledge about this. The ventricular size and configuration are normal. Scattered foci of increased T2 signal are seen in the right matter regions bilaterally.       There is no evidence of mass, hemorrhage, acute infarct or abnormal extra-axial luid collection. Normal appearing flow-voids are present in the vertebral, basilar and carotid artery systems.  The craniocervical junction is normal.    The orbits, paranasal sinuses and mastoid air cells are unremarkable.      There is no abnormal parenchymal or meningeal enhancement.     IMPRESSION  IMPRESSION:      No acute intracranial findings. Mild changes of chronic small vessel ischemia. 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 She has an unusual amyotrophy syndrome. Doppler has been done. Tissue on and around eye reversed. Just had procedure to turn her eyelid around. Bruising on face noted today. She is driving, and has gotten lost a couple of times when going to relatively unfamiliar place. Last year she did get lost two blocks away from where she had known she had been. She was confused last year, thinking she was in [place] but was in [place]. Daughter notices that the patient cannot relay a message. Forgets the content of conversations. Had a conversation with doctor as to why procedure won't be done she had no recollection as to why. She has looked at photo albums of her children and thinks they are her siblings. Needs reminders for medications, finances. Using wrong words. Has to repeat things to her. Long term memory is fine. Problems getting worse. No changes in sense of taste or smell. She prepares her own relatively simple meals. Sleep is poor. She has never slept well. No major balance changes. HEr stress level/anxiety and frustration have decreased some. Her son has taken over the finances now. Daughter assisting with medications. She was missing bills. Daughter attends all of her doctor appointments. No counseling or psychiatrist currently. No improvement in memory, though. No prior psychiatric history. No family history of major issues in that regard. Daughter notes that the patient's mother did have dementia (Alzheimer's). She may have had cognitive testing before, perhaps a year ago. Brain tumor removed 35 years ago which was the basis for her right eye blindness.      I see a neuropsych from [facility] in the chart which I also reviewed in detail, which is in the chart.       Neuropsychological Mental Status Exam (NMSE):  Historian: Good  Praxis: No UE apraxia  R/L Orientation: Intact to self and to other  Dress: within normal limits   Weight:Obese   Appearance/Hygiene: within normal limits   Gait: within normal limits   Assistive Devices: None  Mood: within normal limits   Affect: within normal limits   Comprehension: within normal limits   Thought Process: within normal limits   Expressive Language: within normal limits   Receptive Language: within normal limits   Motor:  No cognitive or motor perseveration  ETOH: Denied  Tobacco: Denied  Illicit: Denied  SI/HI: Deied  Psychosis: Denied  Insight: Within normal limits  Judgment: Within normal limits  Other Psych:      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    Chronic pain     left knee    Diabetes ([address]     Ill-defined condition     Aubrey Ankur Romberg Syndrome    Nausea & vomiting     Neuro-degenerative disorders     Derik-Romberg syndrome    Other ill-defined conditions(799.89)     trigeminal neuralgia,blind right eye    PUD (peptic ulcer disease)     Seizures (HCC)     as a child after fall    Thyroid disease     treated / no longer treated       Past Surgical History:   Procedure Laterality Date    HX  SECTION      HX KNEE ARTHROSCOPY      bilater arthroscopic knee surgery    HX OPEN CHOLECYSTECTOMY      HX ORTHOPAEDIC      left rotator cuff repair, shoulder, elbow    HX THYROIDECTOMY  16    Total thyroidectomy, Dr. Charan Carlos @ [facility]     [address]    right optic nerve meningioma removed     NEUROLOGICAL PROCEDURE UNLISTED      gamma knife    TOTAL KNEE ARTHROPLASTY      left       Allergies   Allergen Reactions    Morphine Itching     Pt said she had severe itching after Knee replacement    Fiorinal [Butalbital-Aspirin-Caffeine] Other (comments)     Pt does not remeber       Family History   Problem Relation Age of Onset   Navya Cancer Mother         breast    Dementia Mother     Stroke Father     Stroke Maternal Grandfather     No Known Problems Sister     No Known Problems Brother     No Known Problems Sister     No Known Problems Brother     No Known Problems Brother        Social History     Tobacco Use    Smoking status: Former Smoker    Smokeless tobacco: Never Used   Substance Use Topics    Alcohol use: No    Drug use: No       Current Outpatient Medications   Medication Sig Dispense Refill    dilTIAZem CD (CARDIZEM CD) 120 mg ER capsule Take 120 mg by mouth.  warfarin (COUMADIN) 5 mg tablet Take 5 mg by mouth every evening.  meloxicam (MOBIC) 15 mg tablet Take 15 mg by mouth daily.  cyanocobalamin (VITAMIN B-12) 1,000 mcg tablet Take 1,000 mcg by mouth daily.  ergocalciferol (VITAMIN D2) 50,000 unit capsule Take 50,000 Units by mouth every seven (7) days.  levothyroxine (SYNTHROID) 150 mcg tablet Take 1 Tab by mouth Daily (before breakfast). 30 Tab 5    Aspirin, Buffered 81 mg tab Take 1 Tab by mouth daily.  lovastatin (MEVACOR) 20 mg tablet Take 20 mg by mouth nightly.  metFORMIN (GLUMETZA) 1,000 mg TG24 24 hour tablet Take  by mouth two (2) times a day. Plan:  Obtain authorization for testing from insurance company. Report to follow once testing, scoring, and interpretation completed. ? Organic based neurocognitive issues versus mood disorder or combination of same. ? Problems organic, functional, or both? This note will not be viewable in [address] 79year old high functioning lady with cognitive decline and unusual and rare neurologic disease. The question relates to dementia now as referred by Neurology. ? Organic based cognitive decline versus mood or combination of same? Time: 96116 x 1 NSE 60 minutes with patient and family. Also spent 35 minutes additional reviewing her neuropsych report, those scores, and neuro notes. 39566 x 1`    Neuropsychological Test Results  Patient Testing 1/30/19 Report Completed 1/31/19  A Psychometrist Assisted w/ portions of this evaluation while under my direct  supervision    The following evaluation procedures/tests were administered:      Neuropsychologist Performed, Interpreted, & Reported:  Neuropsychological Mental Status Exam, Revised Memory & Behavior Checklist,  Mini Mental Status Exam, Clock Drawing Test, Juan Francisco-Melzack Pain Questionnaire, Test Of Premorbid Functioning, History Taking  & Clinical Interview With The Patient, Additional History Taking w/ the Patient's Daughter, CASE, ABAS-3, Review Of Available Records. Psychometrist Administered under Neuropsychologist Supervision & Neuropsychologist Interpreted & Neuropsychologist Reported:  Verbal Fluency Tests, Jordy & Jordy - Revised, Trailmaking Test Parts A & B, Wechsler Adult Intelligence Scale - IV, Farhana Continuous Performance Test - III, [place] [employer] - 3, Grooved Pegboard, Gonzales Depression Inventory - II, Gonzales Anxiety Inventory, Personality Assessment Inventory. Test Findings:  Test Findings:  Note:  The patients raw data have been compared with currently available norms which include demographic corrections for age, gender, and/or education. Sometimes, the patients scores are compared to demographically similar individuals as close to the patients age, education level, etc., as possible. \"Average\" is viewed as being +/- 1 standard deviation (SD) from the stated mean for a particular test score. \"Low average\" is viewed as being between 1 and 2 SD below the mean, and above average is viewed as being 1 and 2 SD above the mean.   Scores falling in the borderline range (between 1-1/2 and 2 SD below the mean) are viewed with particular attention as to whether they are normal or abnormal neurocognitive test scores. Other methods of inference in analyzing the test data are also utilized, including the pattern and range of scores in the profile, bilateral motor functions, and the presence, if any, of pathognomonic signs. Behaviorally, the patient was friendly and cooperative and appeared motivated to perform well during this examination. Within this context, the results of this evaluation are viewed as a valid reflection of the patients actual neurocognitive and emotional status. The patient's score of 20/30 on the Mini-Mental Status Exam was impaired. In this regard, she was not oriented to season, day, date, county, or city. Serial 7s were 3/5 correct. Recall for three words after a brief delay was 1/3 correct. Repetition was impaired. Clock drawing was impaired. Her structured word list fluency, as assessed by the FAS Test, was within the moderately impaired range with a T score of 25. Category fluency was within the severely impaired range with a T score of 10. Confrontation naming ability, as assessed by the [facility] - Revised, was within the moderately impaired range at 36/60 correct (T = 25). This pattern of performance is indicative of a patient who is at increased risk for day-to-day problems with verbal fluency and confrontation naming. The patient was administered the [facility] Continuous Performance Test - III and review of the subscales within this instrument did not reveal clinically significant concerns for inattentiveness or impulsivity. This pattern of performance is not indicative of a patient who is at increased risk for day-to-day problems with sustained visual attention/concentration. The patient is showing problems with working memory capacity (2nd %ile) though processing speed (23rd %ile) was low average on the WAIS-IV. Her Verbal Comprehension Index score of 78 was borderline . Her Perceptual Reasoning Index score of 81 was low average. These scores reflect a decline in functioning based on an assessment of premorbid functioning. The patient was administered the [place] Het[place]er Verbal Learning Test  - 3 and generated an impaired range (and positive) learning curve over five repeated auditory word list learning trials. An interference trial was impaired. Free and cued, short and long delayed recall were all impaired. Recognition and forced choice recall were impaired. This pattern of performance is indicative of a patient who is at increased risk for day-to-day problems with auditory learning and/or memory. Simple timed visual motor sequencing (Trailmaking Test Part A) was within the average range with a T score of 46. Her performance on a similar, but more complex task of timed visual motor sequencing (Trailmaking Test Part B) was within the severely impaired range with a T score of 11. This latter test was discontinued. This pattern of performance is indicative of a patient who is at increased risk for day-to-day problems with executive functioning. Fine motor dexterity was within the moderately to severely impaired range bilaterally. This does not raise concern for a particularly lateralized brain dysfunction. The patient rated her current level of pain as \"0/5- No Pain\" on the Juan Francisco-Melzack Pain Questionnaire. She reported periodic pain in her right eye region. Her Gonzales Depression Inventory -II score of 11 was within the minimally depressed range. Her Gonzales Anxiety Inventory score of 5 reflected minimal anxiety. The patient's responses on the Personality Assessment Inventory were deemed valid for interpretation. Within this context, she is concerned about physical functioing ane health matters in general.  There is mild stress associated with this.   The profile is otherwise normal.       The daughter completed the ABAS-3 and reported concerns regarding the patient's general adaptive skills (7th %ile), and conceptual skills (2nd %ile). Social skills (14th %ile) and practical skills (13th %ile) were low aerage. On the CASE, the daughter reported clinically significant concerns regarding the patient's cognitive functioning as well as concern for OCD type issues. Impressions & Recommendations: This is an abnormal range Neuropsychological Evaluation with respect to neurocognitive functioning. In this regard, she is showing impairments with mental status, verbal fluency, working memory, verbal comprehension, confrontation naming, auditory learning, auditory memory, bilateral motor skills, and executive functioning. Casual language skills and attention remain normal.  These are an important strength which  may serve to mask underlying cognitive deficits at times. Emotionally, there is concern for mild adjustment related anxiety. In my opinion, this appears to be a case of mild to moderate dementia. The profile is not consistent with pseudodementia, and AD is likely. I suggest consideration for medication for memory. Supportive counseling for adjustment related anxiety is advised. She should be encouraged to remain as mentally, socially, and physically active as possible. I do not find her competent and a POA should be established if this has not been done so already. She also likely requires supervision for those domains pertaining to memory. This includes medication management supervision and supervision of financial dealings. No driving. The family is supportive and so long as they are able to assist with the supervision as noted, I agree with her current living arrangement. Baseline now established. Follow up prn. Clinical correlation is, of course, indicated. I will discuss these findings with the patient when she follows up with me in the near future.   A follow up Neuropsychological Evaluation is indicated on a prn basis, especially if there are any cognitive and/or emotional changes. DIAGNOSES:  Dementia - Mild To Moderate     Adjustment disorder w/ Anxious Features- Mild     The above information is based upon information currently available to me. If there is any additional information of which I am currently unaware, I would be more than happy to review it upon having it made available to me. Thank you for the opportunity to see this interesting individual.     Sincerely,       Pamela Echevarria. Stevan Worrell, EdS    CC:  Hardik Kumar MD, Roger Acevedo MD    Time Documentation:    17199 x 1: Neurobehavioral Status Exam/Clinical Interview: (1 hour, (already billed on first date of service)    54466*0 Neuropsych testing/data gathering by Neuropsychologist (35 additional minutes, see above)     96138 x 1  96139 x 6 Test Administration/Data Gathering By Technician: (3.5 hours). 14619 x 1 (first 30 minutes), 64555 x 7 (each additional 30 minutes)    96132 x 1  96133 x 1 Testing Evaluation Services by Neuropsychologist (1 hour, 50 minutes) 96132 x 1 (first hour), 96133 x 1 (50 minutes)    Definitions:      67031/67553:  Neurobehavioral Status Exam, Clinical interview. Clinical assessment of thinking, reasoning and judgment, by neuropsychologist, both face to face time with patient and time interpreting those test results and reporting, first and subsequent hours)    81836/59799: Neuropsychological Test Administration by Technician/Psychometrist, first 30 minutes and each additional 30 minutes. The above includes: Record review. Review of history provided by patient. Review of collaborative information. Testing by Clinician. Review of raw data. Scoring. Report writing of individual tests administered by Clinician. Integration of individual tests administered by psychometrist with NSE/testing by clinician, review of records/history/collaborative information, case Conceptualization, treatment planning, clinical decision making, report writing, coordination Of Care. Psychometry test codes as time spent by psychometrist administering and scoring neurocognitive/psychological tests under supervision of neuropsychologist.  Integral services including scoring of raw data, data interpretation, case conceptualization, report writing etcetera were initiated after the patient finished testing/raw data collected and was completed on the date the report was signed.

## 2019-02-22 ENCOUNTER — OFFICE VISIT (OUTPATIENT)
Dept: ENDOCRINOLOGY | Age: 69
End: 2019-02-22

## 2019-02-22 VITALS
WEIGHT: 242.6 LBS | DIASTOLIC BLOOD PRESSURE: 67 MMHG | HEART RATE: 72 BPM | HEIGHT: 65 IN | BODY MASS INDEX: 40.42 KG/M2 | SYSTOLIC BLOOD PRESSURE: 138 MMHG

## 2019-02-22 DIAGNOSIS — E89.0 POSTSURGICAL HYPOTHYROIDISM: ICD-10-CM

## 2019-02-22 DIAGNOSIS — C73 PAPILLARY CARCINOMA OF THYROID (HCC): Primary | ICD-10-CM

## 2019-02-22 NOTE — PROGRESS NOTES
Chief Complaint   Patient presents with    Thyroid Problem     pcp and pharmacy verified   Records since last visit reviewed  History of Present Illness: Lavonne Huerta is a 79 y.o. female here for follow up of thyroid cancer and post-surgical hypothyroidism. Pt notes that it all started in January 2016. She was seeing Dr. Jb Almeida for issues of multiple ear infections. Dr. Jb Almeida noted a thyroid nodule. She was sent for an FNA, which came back positive for papillary cancer. Pt was taken to the OR on 4/15/16, her surgical pathology showed multifocal papillary thyroid cancer the largest being 4.5cm in size, no tumor capsule, no evidence of extra thyroidal extension. Pt was sent for TSH stimulated MURRAY, she received 106 mCi of I-131 on 6/24/16. The one week post WBS showed uptake in the thyroid bed only. Prior to MURRAY her TgAb was 137.9. I did a 4 week levothyroxine withdrawal followed by a WBS I-123, which did not show any evidence of activity to suggest recurrence or metastasis. This was in April 2018. At her last visit in August 2018 she was clinically and biochemically euthyroid on LT4 150mcg 6 pills weekly. Pt notes she has not been having any further issues of Afib \"that has resolved\". She follows with Dr. Larry Rajput of Cardiology. She is no longer taking the Diltazam and Coumadin. Pt is taking her LT4 150mcg Mon-Sat and not on Sunday. She denies issues of tremors, SOB, CP, diarrhea or heat or cold intolerance. She denies issues of dysphagia, dysphonia, chocking sensations, or hoarseness in her voice. Pt is followed by N[facility]uro[facility]Optho at [facility], Dr. Humberto Terrell, for trigeminal Neuralgia. 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 Current Outpatient Medications   Medication Sig    dilTIAZem CD (CARDIZEM CD) 120 mg ER capsule Take 120 mg by mouth.  warfarin (COUMADIN) 5 mg tablet Take 5 mg by mouth every evening.     cyanocobalamin (VITAMIN B-12) 1,000 mcg tablet Take 1,000 mcg by mouth daily.  ergocalciferol (VITAMIN D2) 50,000 unit capsule Take 50,000 Units by mouth every seven (7) days.  levothyroxine (SYNTHROID) 150 mcg tablet Take 1 Tab by mouth Daily (before breakfast).  lovastatin (MEVACOR) 20 mg tablet Take 20 mg by mouth nightly.  metFORMIN (GLUMETZA) 1,000 mg TG24 24 hour tablet Take  by mouth two (2) times a day.  meloxicam (MOBIC) 15 mg tablet Take 15 mg by mouth daily.  Aspirin, Buffered 81 mg tab Take 1 Tab by mouth daily. No current facility-administered medications for this visit. Allergies   Allergen Reactions    Morphine Itching     Pt said she had severe itching after Knee replacement    Fiorinal [Butalbital-Aspirin-Caffeine] Other (comments)     Pt does not remeber     Review of Systems:  - Cardiovascular: no chest pain  - Neurological: no tremors  - Integumentary: skin is normal    Physical Examination:  Blood pressure 138/67, pulse 72, height 5' 5\" (1.651 m), weight 242 lb 9.6 oz (110 kg). - General: pleasant, no distress, good eye contact   - Neck: no masses or LAD palpated  - Cardiovascular: regular, normal rate, nl s1 and s2, no m/r/g   - Integumentary: skin is normal, no edema  - Neurological: reflexes 2+ at biceps, no tremors,   - Psychiatric: normal mood and affect    Data Reviewed:   - None    Assessment/Plan:   1) Thyroid cancer > Pt has no concerning features or physical findings. Will check her Tg and TgAb. Our goal TSH is 0.1-0.5. Will repeat her Thyroid US prior to our next visit in 6 months. 2) Post surgical hypothyroidism > Will check TFTs today and adjust her dose as needed. Pt voices understanding and agreement with the plan. Pain noted and pt was recommended to call her PCP for further evaluation and treatment, as needed      RTC 6 months      Follow-up Disposition:  Return in about 6 months (around 8/22/2019). Copy sent to:  Gildardo Slater and Austin Chapman

## 2019-03-03 LAB
T4 FREE SERPL-MCNC: 2.12 NG/DL (ref 0.82–1.77)
THYROGLOB AB SERPL-ACNC: 672.1 IU/ML (ref 0–0.9)
THYROGLOB SERPL-MCNC: 2.6 NG/ML
TSH SERPL DL<=0.005 MIU/L-ACNC: 0.19 UIU/ML (ref 0.45–4.5)

## 2019-04-02 ENCOUNTER — OFFICE VISIT (OUTPATIENT)
Dept: NEUROLOGY | Age: 69
End: 2019-04-02

## 2019-04-02 DIAGNOSIS — H54.40 BLIND RIGHT EYE: ICD-10-CM

## 2019-04-02 DIAGNOSIS — G30.0 EARLY ONSET ALZHEIMER'S DEMENTIA WITHOUT BEHAVIORAL DISTURBANCE (HCC): Primary | ICD-10-CM

## 2019-04-02 DIAGNOSIS — F02.80 EARLY ONSET ALZHEIMER'S DEMENTIA WITHOUT BEHAVIORAL DISTURBANCE (HCC): Primary | ICD-10-CM

## 2019-04-02 DIAGNOSIS — F43.22 ADJUSTMENT DISORDER WITH ANXIETY: ICD-10-CM

## 2019-04-02 NOTE — PROGRESS NOTES
Interactive office feedback session with the patient and daughter today. I reviewed the results of the recent Neuropsychological Evaluation, including discussing individual tests as well as patient's areas of neurocognitive strength versus weakness. Prior to seeing the patient I reviewed the records, including the previously completed report, the records in [place], and any updated visits from other providers since I saw the patient last.      We discussed, in detail, the following: This is an abnormal range Neuropsychological Evaluation with respect to neurocognitive functioning. In this regard, she is showing impairments with mental status, verbal fluency, working memory, verbal comprehension, confrontation naming, auditory learning, auditory memory, bilateral motor skills, and executive functioning. Casual language skills and attention remain normal.  These are an important strength which  may serve to mask underlying cognitive deficits at times. Emotionally, there is concern for mild adjustment related anxiety.                  In my opinion, this appears to be a case of mild to moderate dementia. The profile is not consistent with pseudodementia, and AD is likely. I suggest consideration for medication for memory. Supportive counseling for adjustment related anxiety is advised. She should be encouraged to remain as mentally, socially, and physically active as possible. I do not find her competent and a POA should be established if this has not been done so already. She also likely requires supervision for those domains pertaining to memory. This includes medication management supervision and supervision of financial dealings. No driving. The family is supportive and so long as they are able to assist with the supervision as noted, I agree with her current living arrangement. Baseline now established. Follow up prn. Clinical correlation is, of course, indicated. I will discuss these findings with the patient when she follows up with me in the near future. A follow up Neuropsychological Evaluation is indicated on a prn basis, especially if there are any cognitive and/or emotional changes.       DIAGNOSES:             Dementia - Mild To Moderate                                      Adjustment disorder w/ Anxious Features- Mild        Education was provided regarding my diagnostic impressions, and we discussed treatment plan/options. I also answered numerous questions related to the clinical findings, including discussing various methods to improve cognition and mood. Counseling provided regarding mood and cognition. CBT and supportive psychotherapy techniques were utilized. We also talked about thyroid issues and talked about memory. SEes. Dr. Renee De León in August and Dr. Alexis Lobo in May and will go from there. Psychotherapy is advised from an anxiety standpoint. Discussed competency issues. Her son does her finances and aysehter does the medications. Seeing less frustration in that regard. Still seeing mild memory issues. The patient will follow up with the referring provider, and reported being very pleased with the services provided. Follow up with [facility] prn. [id number] psychoeducational and supportive and cbt brief session with patient today and daughter. 45 jd.

## 2019-05-31 ENCOUNTER — OFFICE VISIT (OUTPATIENT)
Dept: NEUROLOGY | Age: 69
End: 2019-05-31

## 2019-05-31 VITALS
DIASTOLIC BLOOD PRESSURE: 78 MMHG | BODY MASS INDEX: 40.15 KG/M2 | WEIGHT: 241 LBS | SYSTOLIC BLOOD PRESSURE: 138 MMHG | HEART RATE: 60 BPM | OXYGEN SATURATION: 98 % | HEIGHT: 65 IN

## 2019-05-31 DIAGNOSIS — G30.0 EARLY ONSET ALZHEIMER'S DEMENTIA WITHOUT BEHAVIORAL DISTURBANCE (HCC): Primary | ICD-10-CM

## 2019-05-31 DIAGNOSIS — R41.3 DISTURBANCE OF MEMORY: ICD-10-CM

## 2019-05-31 DIAGNOSIS — I67.89 CEREBRAL MICROVASCULAR DISEASE: ICD-10-CM

## 2019-05-31 DIAGNOSIS — D32.9 MENINGIOMA (HCC): ICD-10-CM

## 2019-05-31 DIAGNOSIS — R47.89 WORD FINDING DIFFICULTY: ICD-10-CM

## 2019-05-31 DIAGNOSIS — H54.40 BLIND RIGHT EYE: ICD-10-CM

## 2019-05-31 DIAGNOSIS — I65.23 BILATERAL CAROTID ARTERY STENOSIS: ICD-10-CM

## 2019-05-31 DIAGNOSIS — F02.80 EARLY ONSET ALZHEIMER'S DEMENTIA WITHOUT BEHAVIORAL DISTURBANCE (HCC): Primary | ICD-10-CM

## 2019-05-31 DIAGNOSIS — G31.84 MILD COGNITIVE IMPAIRMENT: ICD-10-CM

## 2019-05-31 RX ORDER — DONEPEZIL HYDROCHLORIDE 10 MG/1
TABLET, FILM COATED ORAL
Qty: 30 TAB | Refills: 11 | Status: SHIPPED | OUTPATIENT
Start: 2019-05-31 | End: 2020-06-19

## 2019-05-31 RX ORDER — BACLOFEN 20 MG/1
20 TABLET ORAL 3 TIMES DAILY
COMMUNITY
Start: 2019-05-16 | End: 2020-02-24

## 2019-05-31 NOTE — LETTER
5/31/2019 9:40 PM 
 
Patient:  Suha Santana YOB: 1950 Date of Visit: 5/31/2019 Dear No Recipients: Thank you for referring Ms. Micha Gaspar to me for evaluation/treatment. Below are the relevant portions of my assessment and plan of care. Consult REFERRED BY: 
Eli Finney MD 
 
CHIEF COMPLAINT: Memory loss Subjective:  
 
Suha Santana is a 79 y.o. right-handed  female seen today at the request of Dr. Chas Mcneil for evaluation of new problem of needing to go over her neuropsych testing which was just done about a month ago, and to discuss the results of the test and whether or not new treatment was needed for the patient because of her memory loss with the patient's daughter being here also to discuss her results and go over further treatment options in addition. The test did show the patient has a probable mild Alzheimer's type dementia, and that cognitive enhancing agents may be of value for the patient. Because of this we discussed the risk and benefits of the medicines, explained that they are cholinesterase inhibitors, and can sometimes cause GI side effects as far as upset and diarrhea, sometimes can cause syncope or cardiac arrhythmias, and sometimes can cause increased confusion or agitation paradoxically. The family is willing to try the medication after I explained that she should take the medicine at supper and because of any problem in the interim. We will start her at 5 mg a day for 1 month and then go up to 10 mg a day thereafter. New prescription sent in for patient today. Patient has progressive memory loss that is occurring over the last year or 2 by the patient's history and by the history of her daughter-in-law. The patient has no other family history of similar problems.   She has not had any precipitating event, as far as fever, trauma, headaches, meningismus, unusual stress tension or depression, or medication exposure or toxin exposure. She has had no workup for this. She has had no focal weakness, no sensory loss, and no other focal neurologic deficit recently. She apparently does have loss of vision in her right eye of unclear etiology, and had been followed by Dr. Purnima Luz at the [facility] for a while but has not seen him in 2 years, and has no idea why she lost the vision in her eye, and also carries a diagnosis of meningioma of the brain on her chart and she knows nothing about that either. Patient had an MRI scan of the brain. We will also send off all metabolic parameters looking for treatable causes of her memory loss. We will send her for neuropsych testing, and she clearly will need cognitive enhancing agents. She does have a college education and works as a  in the past and remains physically and mentally active. 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 Past Medical History:  
Diagnosis Date  Arthritis   
 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   
 left knee  Diabetes ([address]  Ill-defined condition Med Pane Romberg Syndrome  Nausea & vomiting  Neuro-degenerative disorders DerikRomberg syndrome  Other ill-defined conditions(799.89)   
 trigeminal neuralgia,blind right eye  PUD (peptic ulcer disease)  Seizures ([address]   
 as a child after fall  Thyroid disease   
 treated / no longer treated Past Surgical History:  
Procedure Laterality Date  HX  SECTION  [id number]  HX KNEE ARTHROSCOPY    
 bilater arthroscopic knee surgery [facility]  HX ORTHOPAEDIC    
 left rotator cuff repair, shoulder, elbow  HX THYROIDECTOMY  16 Total thyroidectomy, Dr. Denver Gunning @ [address]  NEUROLOGICAL PROCEDURE UNLISTED  1988  
 right optic nerve meningioma removed  NEUROLOGICAL PROCEDURE UNLISTED    
 gamma knife  TOTAL KNEE ARTHROPLASTY    
 left Family History Problem Relation Age of Onset  Cancer Mother   
     breast  
 Dementia Mother  Stroke Father  Stroke Maternal Grandfather  No Known Problems Sister  No Known Problems Brother  No Known Problems Sister  No Known Problems Brother  No Known Problems Brother Social History Tobacco Use  Smoking status: Former Smoker  Smokeless tobacco: Never Used Substance Use Topics  Alcohol use: No  
   
 
Current Outpatient Medications:  
  baclofen (LIORESAL) 20 mg tablet, Take 20 mg by mouth three (3) times daily. , Disp: , Rfl:  
  donepezil (ARICEPT) 10 mg tablet, 1/2 po every day at supper pc for 1 month, then 1 po qd pc thereafter  Indications: Mild to Moderate Alzheimer's Type Dementia, Disp: 30 Tab, Rfl: 11 
  dilTIAZem CD (CARDIZEM CD) 120 mg ER capsule, Take 120 mg by mouth., Disp: , Rfl:  
  warfarin (COUMADIN) 5 mg tablet, Take 5 mg by mouth every evening., Disp: , Rfl:  
  cyanocobalamin (VITAMIN B-12) 1,000 mcg tablet, Take 1,000 mcg by mouth daily. , Disp: , Rfl:  
  ergocalciferol (VITAMIN D2) 50,000 unit capsule, Take 50,000 Units by mouth every seven (7) days. , Disp: , Rfl:  
  levothyroxine (SYNTHROID) 150 mcg tablet, Take 1 Tab by mouth Daily (before breakfast). , Disp: 30 Tab, Rfl: 5 
  lovastatin (MEVACOR) 20 mg tablet, Take 20 mg by mouth nightly., Disp: , Rfl:  
 
 
 
Allergies Allergen Reactions  Morphine Itching Pt said she had severe itching after Knee replacement  Fiorinal [Butalbital-Aspirin-Caffeine] Other (comments) Pt does not remeber Review of Systems: A comprehensive review of systems was negative except for: Eyes: positive for visual disturbance Ears, nose, mouth, throat, and face: positive for hearing loss and tinnitus Musculoskeletal: positive for myalgias, arthralgias and stiff joints Neurological: positive for memory problems and Memory loss Behvioral/Psych: positive for Memory loss, anxiety and depression Vitals:  
 05/31/19 1136 BP: 138/78 Pulse: 60 SpO2: 98% Weight: 241 lb (109.3 kg) Height: 5' 5\" (1.651 m) Objective: I 
 
 
NEUROLOGICAL EXAM: 
 
Appearance: The patient is well developed, well nourished, provides a fair history and is in no acute distress. Mental Status: Oriented to time, place and person, and the president, patient cannot remember 1 of 3 words at 30 seconds with distraction, she cannot do serial sevens real well, could spell world backward, and had a little difficulty driving a clock that shows a time 10 minutes 11, cognitive function is abnormal and speech is fluent and no aphasia or dysarthria. Mood and affect appropriate. Cranial Nerves:   Intact visual fields. Fundi are benign but poorly seen on the left, and the right is scarred and cannot visualize the fundus and she is blind in that eye. RADHA, EOM's full, no nystagmus, no ptosis. Facial sensation is normal. Corneal reflexes are not tested. Facial movement is asymmetric and she has had my atrophy of the right side of her face. Hearing is abnormal bilaterally. Palate is midline with normal sternocleidomastoid and trapezius muscles are normal. Tongue is midline. Neck without meningismus or bruits Temporal arteries are not tender or enlarged Motor:  5/5 strength in upper and lower proximal and distal muscles. Normal bulk and tone. No fasciculations. Reflexes:   Deep tendon reflexes 1+/4 and symmetrical. 
No babinski or clonus present Sensory:   Normal to touch, pinprick and vibration. DSS is intact Gait:  Normal gait for her age. Tremor:   No tremor noted. Cerebellar:  No cerebellar signs present. Neurovascular:  Normal heart sounds and regular rhythm, peripheral pulses decreased, and no carotid bruits. Assessment: ICD-10-CM ICD-9-CM 1. Early onset Alzheimer's dementia without behavioral disturbance G30.0 331.0 donepezil (ARICEPT) 10 mg tablet F02.80 294.10   
2. Mild cognitive impairment G31.84 331.83 donepezil (ARICEPT) 10 mg tablet 3. Meningioma (HCC) D32.9 225.2 donepezil (ARICEPT) 10 mg tablet 4. Bilateral carotid artery stenosis I65.23 433.10 donepezil (ARICEPT) 10 mg tablet 433.30   
5. Disturbance of memory R41.3 780.93 donepezil (ARICEPT) 10 mg tablet 6. Blind right eye H54.40 369.60 donepezil (ARICEPT) 10 mg tablet 7. Cerebral microvascular disease I67.9 437.9 donepezil (ARICEPT) 10 mg tablet 8. Word finding difficulty R47.89 V40.1 donepezil (ARICEPT) 10 mg tablet Active Problems: * No active hospital problems. * 
 
 
Plan:  
 
Patient has amnestic dementia, most consistent with Alzheimer's disease, but has a history of meningioma, visual loss in her eye, and some type of unusual amyotrophy syndrome that all may be contributing some. We will get an MRI scan, and look for all metabolic parameters that are treatable causes of dementia To be sent for neuropsych testing We will try to get her test results from Dr. Gwendolynn Klinefelter and [facility] Her carotid Doppler studies showed no significant disease today She will call for results of her test, we will start cognitive enhancing agents once her testing is done They will check my chart or call us for results. An hour spent with the patient, going over her history, using her chart in detail, and discussing her diagnosis prognosis and further treatment evaluation. Signed By: Ej Estrella MD   
 May 31, 2019 CC: María Rubin MD 
FAX: [phone] This note will not be viewable in [address] Consult REFERRED BY: 
María Rubin MD 
 
CHIEF COMPLAINT: Memory loss Subjective:  
 
Sean Larry is a 79 y.o. right-handed  female seen today at the request of Dr. Nato Simental for evaluation of new problem of needing to go over her neuropsych testing which was just done about a month ago, and to discuss the results of the test and whether or not new treatment was needed for the patient because of her memory loss with the patient's daughter being here also to discuss her results and go over further treatment options in addition. The test did show the patient has a probable mild Alzheimer's type dementia, and that cognitive enhancing agents may be of value for the patient. Because of this we discussed the risk and benefits of the medicines, explained that they are cholinesterase inhibitors, and can sometimes cause GI side effects as far as upset and diarrhea, sometimes can cause syncope or cardiac arrhythmias, and sometimes can cause increased confusion or agitation paradoxically. The family is willing to try the medication after I explained that she should take the medicine at supper and because of any problem in the interim. We will start her at 5 mg a day for 1 month and then go up to 10 mg a day thereafter. New prescription sent in for patient today. Patient has progressive memory loss that is occurring over the last year or 2 by the patient's history and by the history of her daughter-in-law. The patient has no other family history of similar problems. She has not had any precipitating event, as far as fever, trauma, headaches, meningismus, unusual stress tension or depression, or medication exposure or toxin exposure. She has had no workup for this. She has had no focal weakness, no sensory loss, and no other focal neurologic deficit recently.   She apparently does have loss of vision in her right eye of unclear etiology, and had been followed by Dr. Yoni Turner at the [facility] for a while but has not seen him in 2 years, and has no idea why she lost the vision in her eye, and also carries a diagnosis of meningioma of the brain on her chart and she knows nothing about that either. Patient had an MRI scan of the brain which showed no significant structural lesion of the brain to account for her memory loss, and her metabolic tests were otherwise unremarkable. She does have a college education and works as a  in the past and remains physically and mentally active. 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 Past Medical History:  
Diagnosis Date  Arthritis   
 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   
 left knee  Diabetes ([address]  Ill-defined condition Kori Morelle Romberg Syndrome  Nausea & vomiting  Neuro-degenerative disorders DerikRomberg syndrome  Other ill-defined conditions(799.89)   
 trigeminal neuralgia,blind right eye  PUD (peptic ulcer disease)  Seizures ([address]   
 as a child after fall  Thyroid disease   
 treated / no longer treated Past Surgical History:  
Procedure Laterality Date  HX  SECTION  [id number]  HX KNEE ARTHROSCOPY    
 bilater arthroscopic knee surgery [facility]  HX ORTHOPAEDIC    
 left rotator cuff repair, shoulder, elbow  HX THYROIDECTOMY  16 Total thyroidectomy, Dr. Josh Cuadra @ [facility] [place]  
 right optic nerve meningioma removed  NEUROLOGICAL PROCEDURE UNLISTED    
 gamma knife  TOTAL KNEE ARTHROPLASTY    
 left Family History Problem Relation Age of Onset  Cancer Mother   
     breast  
 Dementia Mother  Stroke Father  Stroke Maternal Grandfather  No Known Problems Sister  No Known Problems Brother  No Known Problems Sister  No Known Problems Brother  No Known Problems Brother Social History Tobacco Use  Smoking status: Former Smoker  Smokeless tobacco: Never Used Substance Use Topics  Alcohol use: No  
   
 
Current Outpatient Medications:  
  baclofen (LIORESAL) 20 mg tablet, Take 20 mg by mouth three (3) times daily. , Disp: , Rfl:  
  donepezil (ARICEPT) 10 mg tablet, 1/2 po every day at supper pc for 1 month, then 1 po qd pc thereafter  Indications: Mild to Moderate Alzheimer's Type Dementia, Disp: 30 Tab, Rfl: 11 
  dilTIAZem CD (CARDIZEM CD) 120 mg ER capsule, Take 120 mg by mouth., Disp: , Rfl:  
  warfarin (COUMADIN) 5 mg tablet, Take 5 mg by mouth every evening., Disp: , Rfl:  
  cyanocobalamin (VITAMIN B-12) 1,000 mcg tablet, Take 1,000 mcg by mouth daily. , Disp: , Rfl:  
  ergocalciferol (VITAMIN D2) 50,000 unit capsule, Take 50,000 Units by mouth every seven (7) days. , Disp: , Rfl:  
  levothyroxine (SYNTHROID) 150 mcg tablet, Take 1 Tab by mouth Daily (before breakfast). , Disp: 30 Tab, Rfl: 5 
  lovastatin (MEVACOR) 20 mg tablet, Take 20 mg by mouth nightly., Disp: , Rfl:  
 
 
 
Allergies Allergen Reactions  Morphine Itching Pt said she had severe itching after Knee replacement  Fiorinal [Butalbital-Aspirin-Caffeine] Other (comments) Pt does not remeber Review of Systems: A comprehensive review of systems was negative except for: Eyes: positive for visual disturbance Ears, nose, mouth, throat, and face: positive for hearing loss and tinnitus Musculoskeletal: positive for myalgias, arthralgias and stiff joints Neurological: positive for memory problems and Memory loss Behvioral/Psych: positive for Memory loss, anxiety and depression Vitals:  
 05/31/19 1136 BP: 138/78 Pulse: 60 SpO2: 98% Weight: 241 lb (109.3 kg) Height: 5' 5\" (1.651 m) Objective: I 
 
 
NEUROLOGICAL EXAM: 
 
Appearance: The patient is well developed, well nourished, provides a fair history and is in no acute distress.   
Mental Status: Oriented to time, place and person, and the president, patient cannot remember 1 of 3 words at 30 seconds with distraction, she cannot do serial sevens real well, could spell world backward, and had a little difficulty driving a clock that shows a time 10 minutes 11, cognitive function is abnormal and speech is fluent and no aphasia or dysarthria. Mood and affect appropriate. Cranial Nerves:   Intact visual fields. Fundi are benign but poorly seen on the left, and the right is scarred and cannot visualize the fundus and she is blind in that eye. RADHA, EOM's full, no nystagmus, no ptosis. Facial sensation is normal. Corneal reflexes are not tested. Facial movement is asymmetric and she has had my atrophy of the right side of her face. Hearing is abnormal bilaterally. Palate is midline with normal sternocleidomastoid and trapezius muscles are normal. Tongue is midline. Neck without meningismus or bruits Temporal arteries are not tender or enlarged Motor:  5/5 strength in upper and lower proximal and distal muscles. Normal bulk and tone. No fasciculations. Reflexes:   Deep tendon reflexes 1+/4 and symmetrical. 
No babinski or clonus present Sensory:   Normal to touch, pinprick and vibration. DSS is intact Gait:  Normal gait for her age. Tremor:   No tremor noted. Cerebellar:  No cerebellar signs present. Neurovascular:  Normal heart sounds and regular rhythm, peripheral pulses decreased, and no carotid bruits. Assessment: ICD-10-CM ICD-9-CM 1. Early onset Alzheimer's dementia without behavioral disturbance G30.0 331.0 donepezil (ARICEPT) 10 mg tablet F02.80 294.10   
2. Mild cognitive impairment G31.84 331.83 donepezil (ARICEPT) 10 mg tablet 3. Meningioma (HCC) D32.9 225.2 donepezil (ARICEPT) 10 mg tablet 4. Bilateral carotid artery stenosis I65.23 433.10 donepezil (ARICEPT) 10 mg tablet 433.30   
5. Disturbance of memory R41.3 780.93 donepezil (ARICEPT) 10 mg tablet 6. Blind right eye H54.40 369.60 donepezil (ARICEPT) 10 mg tablet 7. Cerebral microvascular disease I67.9 437.9 donepezil (ARICEPT) 10 mg tablet 8. Word finding difficulty R47.89 V40.1 donepezil (ARICEPT) 10 mg tablet Active Problems: * No active hospital problems. * 
 
 
Plan:  
 
Patient has amnestic dementia, most consistent with Alzheimer's disease, but has a history of meningioma, visual loss in her eye, and some type of unusual amyotrophy syndrome that all may be contributing some. Her neuropsych testing shows that she does have a senile dementia of the Alzheimer's type, and would be a candidate for cognitive enhancing agents. Plain the risk and benefits of Aricept as far as GI side effects, cardiac side effects, all as above, and the patient and her daughter agreed to try the medication in view of the recommendation for mild memory loss. Her MRI scan and her metabolic testing showed no other treatable cause of her symptoms. We will try to get her test results from Dr. Erick Bone and [facility] Her carotid Doppler studies showed no significant disease today All side effect of the medications explained to the patient and her daughter in detail, and they will call if any problem. Office follow-up in 3 to 6 months time or earlier as need be. Signed By: Antonella Gomes MD   
 May 31, 2019 CC: Rosalva Thomas MD 
FAX: [phone] This note will not be viewable in [address] If you have questions, please do not hesitate to call me. I look forward to following Ms. Donnia Dakins along with you. Sincerely, Antonella Gomes MD

## 2019-05-31 NOTE — PATIENT INSTRUCTIONS
A Healthy Lifestyle: Care Instructions  Your Care Instructions    A healthy lifestyle can help you feel good, stay at a healthy weight, and have plenty of energy for both work and play. A healthy lifestyle is something you can share with your whole family. A healthy lifestyle also can lower your risk for serious health problems, such as high blood pressure, heart disease, and diabetes. You can follow a few steps listed below to improve your health and the health of your family. Follow-up care is a key part of your treatment and safety. Be sure to make and go to all appointments, and call your doctor if you are having problems. It's also a good idea to know your test results and keep a list of the medicines you take. How can you care for yourself at home? · Do not eat too much sugar, fat, or fast foods. You can still have dessert and treats now and then. The goal is moderation. · Start small to improve your eating habits. Pay attention to portion sizes, drink less juice and soda pop, and eat more fruits and vegetables. ? Eat a healthy amount of food. A 3-ounce serving of meat, for example, is about the size of a deck of cards. Fill the rest of your plate with vegetables and whole grains. ? Limit the amount of soda and sports drinks you have every day. Drink more water when you are thirsty. ? Eat at least 5 servings of fruits and vegetables every day. It may seem like a lot, but it is not hard to reach this goal. A serving or helping is 1 piece of fruit, 1 cup of vegetables, or 2 cups of leafy, raw vegetables. Have an apple or some carrot sticks as an afternoon snack instead of a candy bar. Try to have fruits and/or vegetables at every meal.  · Make exercise part of your daily routine. You may want to start with simple activities, such as walking, bicycling, or slow swimming. Try to be active 30 to 60 minutes every day. You do not need to do all 30 to 60 minutes all at once.  For example, you can exercise 3 times a day for 10 or 20 minutes. Moderate exercise is safe for most people, but it is always a good idea to talk to your doctor before starting an exercise program.  · Keep moving. Celina Limb the lawn, work in the garden, or [employer]. Take the stairs instead of the elevator at work. · If you smoke, quit. People who smoke have an increased risk for heart attack, stroke, cancer, and other lung illnesses. Quitting is hard, but there are ways to boost your chance of quitting tobacco for good. ? Use nicotine gum, patches, or lozenges. ? Ask your doctor about stop-smoking programs and medicines. ? Keep trying. In addition to reducing your risk of diseases in the future, you will notice some benefits soon after you stop using tobacco. If you have shortness of breath or asthma symptoms, they will likely get better within a few weeks after you quit. · Limit how much alcohol you drink. Moderate amounts of alcohol (up to 2 drinks a day for men, 1 drink a day for women) are okay. But drinking too much can lead to liver problems, high blood pressure, and other health problems. Family health  If you have a family, there are many things you can do together to improve your health. · Eat meals together as a family as often as possible. · Eat healthy foods. This includes fruits, vegetables, lean meats and dairy, and whole grains. · Include your family in your fitness plan. Most people think of activities such as jogging or tennis as the way to fitness, but there are many ways you and your family can be more active. Anything that makes you breathe hard and gets your heart pumping is exercise. Here are some tips:  ? Walk to do errands or to take your child to school or the bus.  ? Go for a family bike ride after dinner instead of watching TV. Where can you learn more? Go to http://zach-cesar.info/. Enter P715 in the search box to learn more about \"A Healthy Lifestyle: Care Instructions. \"  Current as of: September 11, 2018  Content Version: 11.9  © 5740-8598 [employer]. Care instructions adapted under license by [employer] (which disclaims liability or warranty for this information). If you have questions about a medical condition or this instruction, always ask your healthcare professional. [address] any warranty or liability for your use of this information. Office Policies    o Phone calls/patient messages:  Please allow up to 24 hours for someone in the office to contact you about your call or message. Be mindful your provider may be out of the office or your message may require further review. We encourage you to use [email or web address] for your messages as this is a faster, more efficient way to communicate with our office    o Medication Refills:  Prescription medications require up to 48 business hours to process. We encourage you to use [email or web address] for your refills. For controlled medications: Please allow up to 72 business hours to process. Certain medications may require you to  a written prescription at our office. NO narcotic/controlled medications will be prescribed after 4pm Monday through Friday or on weekends    o Form/Paperwork Completion:  We ask that you allow 7-14 business days. You may also download your forms to [email or web address] to have your doctor print off.

## 2019-06-01 NOTE — PROGRESS NOTES
Consult  REFERRED BY:  Gifty Nieto MD    CHIEF COMPLAINT: Memory loss      Subjective:     Daniel Wilkinson is a 79 y.o. right-handed  female seen today at the request of Dr. Terry Kiser for evaluation of new problem of needing to go over her neuropsych testing which was just done about a month ago, and to discuss the results of the test and whether or not new treatment was needed for the patient because of her memory loss with the patient's daughter being here also to discuss her results and go over further treatment options in addition. The test did show the patient has a probable mild Alzheimer's type dementia, and that cognitive enhancing agents may be of value for the patient. Because of this we discussed the risk and benefits of the medicines, explained that they are cholinesterase inhibitors, and can sometimes cause GI side effects as far as upset and diarrhea, sometimes can cause syncope or cardiac arrhythmias, and sometimes can cause increased confusion or agitation paradoxically. The family is willing to try the medication after I explained that she should take the medicine at supper and because of any problem in the interim. We will start her at 5 mg a day for 1 month and then go up to 10 mg a day thereafter. New prescription sent in for patient today. Patient has progressive memory loss that is occurring over the last year or 2 by the patient's history and by the history of her daughter-in-law. The patient has no other family history of similar problems. She has not had any precipitating event, as far as fever, trauma, headaches, meningismus, unusual stress tension or depression, or medication exposure or toxin exposure. She has had no workup for this. She has had no focal weakness, no sensory loss, and no other focal neurologic deficit recently.   She apparently does have loss of vision in her right eye of unclear etiology, and had been followed by Dr. Shani Hudson at the [facility] for a while but has not seen him in 2 years, and has no idea why she lost the vision in her eye, and also carries a diagnosis of meningioma of the brain on her chart and she knows nothing about that either. Patient had an MRI scan of the brain which showed no significant structural lesion of the brain to account for her memory loss, and her metabolic tests were otherwise unremarkable. She does have a college education and works as a  in the past and remains physically and mentally active. 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    Chronic pain     left knee    Diabetes ([address]     Ill-defined condition     Blairee Chambers Romberg Syndrome    Nausea & vomiting     Neuro-degenerative disorders     Derik-Romberg syndrome    Other ill-defined conditions(799.89)     trigeminal neuralgia,blind right eye    PUD (peptic ulcer disease)     Seizures (HCC)     as a child after fall    Thyroid disease     treated / no longer treated      Past Surgical History:   Procedure Laterality Date    HX  SECTION      HX KNEE ARTHROSCOPY      bilater arthroscopic knee surgery    HX OPEN CHOLECYSTECTOMY      HX ORTHOPAEDIC      left rotator cuff repair, shoulder, elbow    HX THYROIDECTOMY  16    Total thyroidectomy, Dr. Kunal Contreras @ [facility]     HX  [facility]    right optic nerve meningioma removed     NEUROLOGICAL PROCEDURE UNLISTED      gamma knife    TOTAL KNEE ARTHROPLASTY      left     Family History   Problem Relation Age of Onset    Cancer Mother         breast    Dementia Mother     Stroke Father     Stroke Maternal Grandfather     No Known Problems Sister     No Known Problems Brother     No Known Problems Sister     No Known Problems Brother     No Known Problems Brother       Social History     Tobacco Use    Smoking status: Former Smoker    Smokeless tobacco: Never Used   Substance Use Topics    Alcohol use: No         Current Outpatient Medications:     baclofen (LIORESAL) 20 mg tablet, Take 20 mg by mouth three (3) times daily. , Disp: , Rfl:     donepezil (ARICEPT) 10 mg tablet, 1/2 po every day at supper pc for 1 month, then 1 po qd pc thereafter  Indications: Mild to Moderate Alzheimer's Type Dementia, Disp: 30 Tab, Rfl: 11    dilTIAZem CD (CARDIZEM CD) 120 mg ER capsule, Take 120 mg by mouth., Disp: , Rfl:     warfarin (COUMADIN) 5 mg tablet, Take 5 mg by mouth every evening., Disp: , Rfl:     cyanocobalamin (VITAMIN B-12) 1,000 mcg tablet, Take 1,000 mcg by mouth daily. , Disp: , Rfl:     ergocalciferol (VITAMIN D2) 50,000 unit capsule, Take 50,000 Units by mouth every seven (7) days. , Disp: , Rfl:     levothyroxine (SYNTHROID) 150 mcg tablet, Take 1 Tab by mouth Daily (before breakfast). , Disp: 30 Tab, Rfl: 5    lovastatin (MEVACOR) 20 mg tablet, Take 20 mg by mouth nightly., Disp: , Rfl:         Allergies   Allergen Reactions    Morphine Itching     Pt said she had severe itching after Knee replacement    Fiorinal [Butalbital-Aspirin-Caffeine] Other (comments)     Pt does not remeber        Review of Systems:  A comprehensive review of systems was negative except for: Eyes: positive for visual disturbance  Ears, nose, mouth, throat, and face: positive for hearing loss and tinnitus  Musculoskeletal: positive for myalgias, arthralgias and stiff joints  Neurological: positive for memory problems and Memory loss  Behvioral/Psych: positive for Memory loss, anxiety and depression   Vitals:    05/31/19 1136   BP: 138/78   Pulse: 60   SpO2: 98%   Weight: 241 lb (109.3 kg)   Height: 5' 5\" (1.651 m)     Objective:     I      NEUROLOGICAL EXAM:    Appearance:   The patient is well developed, well nourished, provides a fair history and is in no acute distress. Mental Status: Oriented to time, place and person, and the president, patient cannot remember 1 of 3 words at 30 seconds with distraction, she cannot do serial sevens real well, could spell world backward, and had a little difficulty driving a clock that shows a time 10 minutes 11, cognitive function is abnormal and speech is fluent and no aphasia or dysarthria. Mood and affect appropriate. Cranial Nerves:   Intact visual fields. Fundi are benign but poorly seen on the left, and the right is scarred and cannot visualize the fundus and she is blind in that eye. RADHA, EOM's full, no nystagmus, no ptosis. Facial sensation is normal. Corneal reflexes are not tested. Facial movement is asymmetric and she has had my atrophy of the right side of her face. Hearing is abnormal bilaterally. Palate is midline with normal sternocleidomastoid and trapezius muscles are normal. Tongue is midline. Neck without meningismus or bruits  Temporal arteries are not tender or enlarged   Motor:  5/5 strength in upper and lower proximal and distal muscles. Normal bulk and tone. No fasciculations. Reflexes:   Deep tendon reflexes 1+/4 and symmetrical.  No babinski or clonus present   Sensory:   Normal to touch, pinprick and vibration. DSS is intact   Gait:  Normal gait for her age. Tremor:   No tremor noted. Cerebellar:  No cerebellar signs present. Neurovascular:  Normal heart sounds and regular rhythm, peripheral pulses decreased, and no carotid bruits. Assessment:       ICD-10-CM ICD-9-CM    1. Early onset Alzheimer's dementia without behavioral disturbance G30.0 331.0 donepezil (ARICEPT) 10 mg tablet    F02.80 294.10    2. Mild cognitive impairment G31.84 331.83 donepezil (ARICEPT) 10 mg tablet   3. Meningioma (HCC) D32.9 225.2 donepezil (ARICEPT) 10 mg tablet   4.  Bilateral carotid artery stenosis I65.23 433.10 donepezil (ARICEPT) 10 mg tablet     433.30    5. Disturbance of memory R41.3 780.93 donepezil (ARICEPT) 10 mg tablet   6. Blind right eye H54.40 369.60 donepezil (ARICEPT) 10 mg tablet   7. Cerebral microvascular disease I67.9 437.9 donepezil (ARICEPT) 10 mg tablet   8. Word finding difficulty R47.89 V40.1 donepezil (ARICEPT) 10 mg tablet     Active Problems:    * No active hospital problems. *      Plan:     Patient has amnestic dementia, most consistent with Alzheimer's disease, but has a history of meningioma, visual loss in her eye, and some type of unusual amyotrophy syndrome that all may be contributing some. Her neuropsych testing shows that she does have a senile dementia of the Alzheimer's type, and would be a candidate for cognitive enhancing agents. Plain the risk and benefits of Aricept as far as GI side effects, cardiac side effects, all as above, and the patient and her daughter agreed to try the medication in view of the recommendation for mild memory loss. Her MRI scan and her metabolic testing showed no other treatable cause of her symptoms. We will try to get her test results from Dr. Dulce Maria Zhang and [facility]  Her carotid Doppler studies showed no significant disease today  All side effect of the medications explained to the patient and her daughter in detail, and they will call if any problem. Office follow-up in 3 to 6 months time or earlier as need be. Signed By: Radha Pérez MD     May 31, 2019       CC: Soco Yanez MD  FAX: [phone]    This note will not be viewable in [address]

## 2019-06-24 DIAGNOSIS — C73 PAPILLARY CARCINOMA OF THYROID (HCC): Primary | ICD-10-CM

## 2019-08-23 ENCOUNTER — OFFICE VISIT (OUTPATIENT)
Dept: ENDOCRINOLOGY | Age: 69
End: 2019-08-23

## 2019-08-23 VITALS
WEIGHT: 234.6 LBS | HEIGHT: 65 IN | BODY MASS INDEX: 39.09 KG/M2 | HEART RATE: 86 BPM | DIASTOLIC BLOOD PRESSURE: 70 MMHG | SYSTOLIC BLOOD PRESSURE: 160 MMHG

## 2019-08-23 DIAGNOSIS — E89.0 POSTSURGICAL HYPOTHYROIDISM: ICD-10-CM

## 2019-08-23 DIAGNOSIS — C73 PAPILLARY CARCINOMA OF THYROID (HCC): Primary | ICD-10-CM

## 2019-08-23 NOTE — PROGRESS NOTES
Chief Complaint   Patient presents with    Thyroid Problem     pcp and pharmacy verified   Records since last visit reviewed  History of Present Illness: Benjamin Pillai is a 79 y.o. female here for follow up of thyroid cancer and post-surgical hypothyroidism. Pt notes that it all started in January 2016. She was seeing Dr. Hugh Pierce for issues of multiple ear infections. Dr. Hugh Pierce noted a thyroid nodule. She was sent for an FNA, which came back positive for papillary cancer. Pt was taken to the OR on 4/15/16, her surgical pathology showed multifocal papillary thyroid cancer the largest being 4.5cm in size, no tumor capsule, no evidence of extra thyroidal extension. Pt was sent for TSH stimulated MURRAY, she received 106 mCi of I-131 on 6/24/16. The one week post WBS showed uptake in the thyroid bed only. Prior to MURRAY her TgAb was 137.9. In April 2018 I did a 4 week levothyroxine withdrawal followed by a WBS I-123, which did not show any evidence of activity to suggest recurrence or metastasis. Pt notes she has not been having any further issues of Afib \"that has resolved\". She follows with Dr. Korey Garcia of Cardiology. She is still on warfarin. Pt is taking her LT4 150mcg Mon-Sat and not on Sunday. She denies issues of tremors, SOB, CP, diarrhea or heat or cold intolerance. She denies issues of dysphagia, dysphonia, chocking sensations, or hoarseness in her voice. She was diagnosed with mild dementia since our last visit and was started on medication for that which her daughter notes has been helping. Pt is followed by Paula at [facility], Dr. Irlanda Hardin, for trigeminal Neuralgia. 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 Pt denies issues of CP, SOB, HA, N/V. Current Outpatient Medications   Medication Sig    baclofen (LIORESAL) 20 mg tablet Take 20 mg by mouth three (3) times daily.     donepezil (ARICEPT) 10 mg tablet 1/2 po every day at supper pc for 1 month, then 1 po qd pc thereafter  Indications: Mild to Moderate Alzheimer's Type Dementia (Patient taking differently: 1 po every day  Indications: Mild to Moderate Alzheimer's Type Dementia)    warfarin (COUMADIN) 5 mg tablet Take 5 mg by mouth every evening.  cyanocobalamin (VITAMIN B-12) 1,000 mcg tablet Take 1,000 mcg by mouth daily.  ergocalciferol (VITAMIN D2) 50,000 unit capsule Take 50,000 Units by mouth every seven (7) days.  levothyroxine (SYNTHROID) 150 mcg tablet Take 1 Tab by mouth Daily (before breakfast).  lovastatin (MEVACOR) 20 mg tablet Take 20 mg by mouth nightly. No current facility-administered medications for this visit. Allergies   Allergen Reactions    Morphine Itching     Pt said she had severe itching after Knee replacement    Fiorinal [Butalbital-Aspirin-Caffeine] Other (comments)     Pt does not remeber     Review of Systems:  - Cardiovascular: no chest pain  - Neurological: no tremors  - Integumentary: skin is normal    Physical Examination:  Blood pressure (!) 216/102, pulse 86, height 5' 5\" (1.651 m), weight 234 lb 9.6 oz (106.4 kg). - General: pleasant, no distress, good eye contact   - Neck: no masses or LAD palpated  - Cardiovascular: regular, normal rate, nl s1 and s2, no m/r/g   - Integumentary: skin is normal, no edema  - Neurological: reflexes 2+ at biceps, no tremors,   - Psychiatric: normal mood and affect    Data Reviewed:   - None    Assessment/Plan:   1) Thyroid cancer > Pt has no concerning features or physical findings. Will check her Tg and TgAb. Our goal TSH is 0.1-0.5. Will order a thyroid Ultrasound. 2) Post surgical hypothyroidism > Will check TFTs today and adjust her dose as needed. Pt voices understanding and agreement with the plan. Pain noted and pt was recommended to call her PCP for further evaluation and treatment, as needed      RTC 6 months      Follow-up and Dispositions    · Return in about 6 months (around 2/23/2020). Copy sent to:  Gildardo Frey and Cece Briones

## 2019-08-30 LAB
T4 FREE SERPL-MCNC: 2.48 NG/DL (ref 0.82–1.77)
THYROGLOB AB SERPL-ACNC: 555.1 IU/ML (ref 0–0.9)
THYROGLOB SERPL-MCNC: 5.1 NG/ML
TSH SERPL DL<=0.005 MIU/L-ACNC: 0.01 UIU/ML (ref 0.45–4.5)

## 2019-09-03 DIAGNOSIS — E89.0 POSTSURGICAL HYPOTHYROIDISM: Primary | ICD-10-CM

## 2019-09-03 RX ORDER — LEVOTHYROXINE SODIUM 100 UG/1
100 TABLET ORAL
Qty: 30 TAB | Refills: 3 | Status: SHIPPED | OUTPATIENT
Start: 2019-09-03 | End: 2019-12-26

## 2019-09-04 ENCOUNTER — HOSPITAL ENCOUNTER (OUTPATIENT)
Dept: ULTRASOUND IMAGING | Age: 69
Discharge: HOME | End: 2019-09-04
Attending: INTERNAL MEDICINE
Payer: MEDICARE

## 2019-09-04 DIAGNOSIS — C73 PAPILLARY CARCINOMA OF THYROID (HCC): ICD-10-CM

## 2019-09-04 PROCEDURE — 76536 US EXAM OF HEAD AND NECK: CPT

## 2019-10-15 DIAGNOSIS — E89.0 POSTSURGICAL HYPOTHYROIDISM: ICD-10-CM

## 2020-02-24 ENCOUNTER — OFFICE VISIT (OUTPATIENT)
Dept: ENDOCRINOLOGY | Age: 70
End: 2020-02-24

## 2020-02-24 VITALS
DIASTOLIC BLOOD PRESSURE: 60 MMHG | WEIGHT: 260.2 LBS | SYSTOLIC BLOOD PRESSURE: 135 MMHG | BODY MASS INDEX: 43.35 KG/M2 | HEART RATE: 55 BPM | HEIGHT: 65 IN

## 2020-02-24 DIAGNOSIS — E89.0 POSTSURGICAL HYPOTHYROIDISM: ICD-10-CM

## 2020-02-24 DIAGNOSIS — C73 PAPILLARY CARCINOMA OF THYROID (HCC): Primary | ICD-10-CM

## 2020-02-24 RX ORDER — APIXABAN 5 MG/1
TABLET, FILM COATED ORAL
COMMUNITY
Start: 2020-02-19

## 2020-02-24 RX ORDER — DILTIAZEM HYDROCHLORIDE 120 MG/1
CAPSULE, EXTENDED RELEASE ORAL
COMMUNITY
Start: 2020-01-24

## 2020-02-24 NOTE — PROGRESS NOTES
Chief Complaint   Patient presents with    Thyroid Problem     pcp and pharmacy verified   Records since last visit reviewed  History of Present Illness: Kendall Sadler is a 81 y.o. female here for follow up of thyroid cancer and post-surgical hypothyroidism. Pt notes that it all started in January 2016. She was seeing Dr. Jazmín Borden for issues of multiple ear infections. Dr. Jazmín Borden noted a thyroid nodule. She was sent for an FNA, which came back positive for papillary cancer. Pt was taken to the OR on 4/15/16, her surgical pathology showed multifocal papillary thyroid cancer the largest being 4.5cm in size, no tumor capsule, no evidence of extra thyroidal extension. Pt was sent for TSH stimulated MURRAY, she received 106 mCi of I-131 on 6/24/16. The one week post WBS showed uptake in the thyroid bed only. Prior to MURRAY her TgAb was 137.9. In April 2018 I did a 4 week levothyroxine withdrawal followed by a WBS I-123, which did not show any evidence of activity to suggest recurrence or metastasis. In September 2019 she had a thyroid US which showed an empty thyroid bed and no LAD. Pt notes she has not been having any further issues of Afib \"that has resolved\". She follows with Dr. Leigh Ann Vazquez of Cardiology. She is now off the warfarin and taking Elequis. Pt is taking her LT4 100mcg daily. She denies issues of tremors, SOB, CP, diarrhea or heat or cold intolerance. She denies issues of dysphagia, dysphonia, chocking sensations, or hoarseness in her voice. She was diagnosed with mild dementia in 2019 and was started on medication for that, which her daughter notes has been helping. She is following with Dr. Jesús Shirley of Neurology    Pt is no longer followed by [facility] at [facility], Dr. Rocky Mckenzie, for trigeminal Neuralgia. 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 Pt denies issues of CP, SOB, HA, N/V.     Current Outpatient Medications   Medication Sig    ELIQUIS 5 mg tablet TAKE 1 TABLET BY MOUTH TWICE DAILY    CARTIA  mg ER capsule TAKE 1 CAPSULE BY MOUTH ONCE DAILY    levothyroxine (SYNTHROID) 100 mcg tablet TAKE 1 TABLET BY MOUTH ONCE DAILY BEFORE BREAKFAST (DOSEAGE  CHANGE)    donepezil (ARICEPT) 10 mg tablet 1/2 po every day at supper pc for 1 month, then 1 po qd pc thereafter  Indications: Mild to Moderate Alzheimer's Type Dementia (Patient taking differently: 1 po every day  Indications: Mild to Moderate Alzheimer's Type Dementia)    cyanocobalamin (VITAMIN B-12) 1,000 mcg tablet Take 1,000 mcg by mouth daily.  ergocalciferol (VITAMIN D2) 50,000 unit capsule Take 50,000 Units by mouth every seven (7) days.  lovastatin (MEVACOR) 20 mg tablet Take 20 mg by mouth nightly. No current facility-administered medications for this visit. Allergies   Allergen Reactions    Morphine Itching     Pt said she had severe itching after Knee replacement    Fiorinal [Butalbital-Aspirin-Caffeine] Other (comments)     Pt does not remeber     Review of Systems:  - Cardiovascular: no chest pain  - Neurological: no tremors  - Integumentary: skin is normal    Physical Examination:  Blood pressure 135/60, pulse (!) 55, height 5' 5\" (1.651 m), weight 260 lb 3.2 oz (118 kg). - General: pleasant, no distress, good eye contact   - Neck: no masses or LAD palpated  - Cardiovascular: regular, normal rate, nl s1 and s2, no m/r/g   - Integumentary: skin is normal, no edema  - Neurological: reflexes 2+ at biceps, no tremors,   - Psychiatric: normal mood and affect    Data Reviewed:   - None    Assessment/Plan:   1) Thyroid cancer > Pt has no concerning features or physical findings. Will follow her  Tg and TgAb. Our goal TSH is 0.1-0.5. No further imaging needed, unless her Tg/TgAb start to change or she had a change in her clinical symptoms. 2) Post surgical hypothyroidism > Will check TFTs today and adjust her dose as needed.     Pt voices understanding and agreement with the plan.      RTC 6 months      Follow-up and Dispositions    · Return in about 6 months (around 8/24/2020). Copy sent to:  Gildardo Barrett and Emime Carreno

## 2020-03-02 LAB
T4 FREE SERPL-MCNC: 1.16 NG/DL (ref 0.82–1.77)
THYROGLOB AB SERPL-ACNC: 644 IU/ML (ref 0–0.9)
THYROGLOB SERPL-MCNC: 2.8 NG/ML
TSH SERPL DL<=0.005 MIU/L-ACNC: 8.74 UIU/ML (ref 0.45–4.5)

## 2020-03-03 DIAGNOSIS — E89.0 POSTSURGICAL HYPOTHYROIDISM: ICD-10-CM

## 2020-03-03 DIAGNOSIS — C73 PAPILLARY CARCINOMA OF THYROID (HCC): Primary | ICD-10-CM

## 2020-03-03 RX ORDER — LEVOTHYROXINE SODIUM 125 UG/1
TABLET ORAL
Qty: 30 TAB | Refills: 5 | Status: SHIPPED | OUTPATIENT
Start: 2020-03-03 | End: 2020-09-01 | Stop reason: SDUPTHER

## 2020-05-01 DIAGNOSIS — C73 PAPILLARY CARCINOMA OF THYROID (HCC): ICD-10-CM

## 2020-05-01 DIAGNOSIS — E89.0 POSTSURGICAL HYPOTHYROIDISM: ICD-10-CM

## 2020-08-10 DIAGNOSIS — E89.0 POSTSURGICAL HYPOTHYROIDISM: ICD-10-CM

## 2020-08-10 DIAGNOSIS — C73 PAPILLARY CARCINOMA OF THYROID (HCC): Primary | ICD-10-CM

## 2020-08-21 LAB
T4 FREE SERPL-MCNC: 1.27 NG/DL (ref 0.82–1.77)
TSH SERPL DL<=0.005 MIU/L-ACNC: 9.02 UIU/ML (ref 0.45–4.5)

## 2020-08-24 DIAGNOSIS — E89.0 POSTSURGICAL HYPOTHYROIDISM: ICD-10-CM

## 2020-08-24 DIAGNOSIS — C73 PAPILLARY CARCINOMA OF THYROID (HCC): ICD-10-CM

## 2020-09-01 ENCOUNTER — VIRTUAL VISIT (OUTPATIENT)
Dept: ENDOCRINOLOGY | Age: 70
End: 2020-09-01
Payer: MEDICARE

## 2020-09-01 DIAGNOSIS — E89.0 POSTSURGICAL HYPOTHYROIDISM: ICD-10-CM

## 2020-09-01 DIAGNOSIS — C73 PAPILLARY CARCINOMA OF THYROID (HCC): Primary | ICD-10-CM

## 2020-09-01 PROCEDURE — 3017F COLORECTAL CA SCREEN DOC REV: CPT | Performed by: INTERNAL MEDICINE

## 2020-09-01 PROCEDURE — G8400 PT W/DXA NO RESULTS DOC: HCPCS | Performed by: INTERNAL MEDICINE

## 2020-09-01 PROCEDURE — G8432 DEP SCR NOT DOC, RNG: HCPCS | Performed by: INTERNAL MEDICINE

## 2020-09-01 PROCEDURE — 1101F PT FALLS ASSESS-DOCD LE1/YR: CPT | Performed by: INTERNAL MEDICINE

## 2020-09-01 PROCEDURE — 1090F PRES/ABSN URINE INCON ASSESS: CPT | Performed by: INTERNAL MEDICINE

## 2020-09-01 PROCEDURE — 99214 OFFICE O/P EST MOD 30 MIN: CPT | Performed by: INTERNAL MEDICINE

## 2020-09-01 PROCEDURE — G8427 DOCREV CUR MEDS BY ELIG CLIN: HCPCS | Performed by: INTERNAL MEDICINE

## 2020-09-01 RX ORDER — CARBAMAZEPINE 200 MG/1
200 TABLET ORAL 2 TIMES DAILY
COMMUNITY
Start: 2020-05-28 | End: 2020-11-24

## 2020-09-01 RX ORDER — LEVOTHYROXINE SODIUM 150 UG/1
TABLET ORAL
Qty: 30 TAB | Refills: 5 | Status: SHIPPED | OUTPATIENT
Start: 2020-09-01 | End: 2021-03-05

## 2020-09-01 NOTE — PROGRESS NOTES
Lab Results   Component Value Date/Time    TSH 9.020 (H) 08/20/2020 11:34 AM    T4, Free 1.27 08/20/2020 11:34 AM

## 2020-09-01 NOTE — PROGRESS NOTES
Chief Complaint   Patient presents with    Thyroid Problem     Doxy [phone]    Diabetes   Records since last visit reviewed  History of Present Illness: Gilbert Pace is a 81 y.o. female here for follow up of thyroid cancer and post-surgical hypothyroidism. Pt notes that it all started in January 2016. She was seeing Dr. Leticia Morales for issues of multiple ear infections. Dr. Leticia Morales noted a thyroid nodule. She was sent for an FNA, which came back positive for papillary cancer. Pt was taken to the OR on 4/15/16, her surgical pathology showed multifocal papillary thyroid cancer the largest being 4.5cm in size, no tumor capsule, no evidence of extra thyroidal extension. Pt was sent for TSH stimulated MURRAY, she received 106 mCi of I-131 on 6/24/16. The one week post WBS showed uptake in the thyroid bed only. Prior to MURRAY her TgAb was 137.9. In April 2018 I did a 4 week levothyroxine withdrawal followed by a WBS I-123, which did not show any evidence of activity to suggest recurrence or metastasis. In September 2019 she had a thyroid US which showed an empty thyroid bed and no LAD. Pt notes she has not been having any further issues of Afib \"that has resolved\". She follows with Dr. Christa Waite of Cardiology. She is now off the warfarin and taking Elequis. Pt and her daughter notes that she is still taking the LT4 125mcg per day. Her daughter notes that she keeps the pills in a pill box and notes that pt is not missing any of her doses of her LT4. Her daughter is monitoring to make sure pt is taking her pills. Pt's daughter notes that pt was having issues of facial nerve pain and her Neurologist started her on Tegertol, which has helped with the nerve pain. She was diagnosed with mild dementia in 2019 and was started on medication for that, which her daughter notes has been helping.   She is following with Dr. Angel Abad of Neurology    She denies issues of tremors, SOB, CP, diarrhea or heat or cold intolerance. She denies issues of dysphagia, dysphonia, chocking sensations, or hoarseness in her voice. Pt is no longer followed by [facility] at [facility], Dr. Raiford Hashimoto, for trigeminal Neuralgia. 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 Pt denies issues of CP, SOB, HA, N/V. Because of her dementia, a majority of the history was obtained from her daughter. Current Outpatient Medications   Medication Sig    carBAMazepine (TEGretol) 200 mg tablet Take 200 mg by mouth two (2) times a day.  donepeziL (ARICEPT) 10 mg tablet Take 1 Tab by mouth daily (with dinner). TAKE 1/2 (ONE-HALF) TABLET BY MOUTH ONCE DAILY AT  SUPPER  AFTER  MEALS  FOR  1  MONTH  THEN  1  EVERY  DAY  AFTER  MEALS  THEREAFTER    levothyroxine (SYNTHROID) 125 mcg tablet TAKE 1 TABLET BY MOUTH ONCE DAILY BEFORE BREAKFAST (DOSEAGE  CHANGE)    ELIQUIS 5 mg tablet TAKE 1 TABLET BY MOUTH TWICE DAILY    CARTIA  mg ER capsule TAKE 1 CAPSULE BY MOUTH ONCE DAILY    cyanocobalamin (VITAMIN B-12) 1,000 mcg tablet Take 1,000 mcg by mouth daily.  ergocalciferol (VITAMIN D2) 50,000 unit capsule Take 50,000 Units by mouth every seven (7) days.  lovastatin (MEVACOR) 20 mg tablet Take 20 mg by mouth nightly. No current facility-administered medications for this visit. Allergies   Allergen Reactions    Morphine Itching     Pt said she had severe itching after Knee replacement    Fiorinal [Butalbital-Aspirin-Caffeine] Other (comments)     Pt does not remeber     Review of Systems:  - Cardiovascular: no chest pain  - Neurological: no tremors  - Integumentary: skin is normal    Physical Examination:  There were no vitals taken for this visit.   - GENERAL: NCAT, Appears well nourished   - EYES: EOMI, non-icteric, no proptosis   - Ear/Nose/Throat: NCAT, no visible inflammation or masses   - CARDIOVASCULAR: no cyanosis, no visible JVD   - RESPIRATORY: respiratory effort normal without any distress or labored breathing   - MUSCULOSKELETAL: Normal ROM of neck and upper extremities observed   - SKIN: No rash on face   - NEUROLOGIC:  No facial asymmetry (Cranial nerve 7 motor function), No gaze palsy   - PSYCHIATRIC: Normal affect, Normal insight and judgement   -     Data Reviewed:   Component      Latest Ref Rng & Units 8/20/2020 8/20/2020          11:34 AM 11:34 AM   TSH      0.450 - 4.500 uIU/mL  9.020 (H)   T4, Free      0.82 - 1.77 ng/dL 1.27        Assessment/Plan:   1) Thyroid cancer > Pt has no concerning features or physical findings. Will follow her Tg and TgAb. No further imaging needed, unless her Tg/TgAb start to change or she had a change in her clinical symptoms. 2) Post surgical hypothyroidism > Her TSH last week was up to 9.02 with FT4 of 1.27. Will increase her LT4 from 125mcg to 150mcg per day and repeat her labs in 3 months    Pt and her daughter voicesunderstanding and agreement with the plan. RTC 3 months      Copy sent to:  Gildardo Viera and Monica Noel

## 2020-12-01 DIAGNOSIS — C73 PAPILLARY CARCINOMA OF THYROID (HCC): ICD-10-CM

## 2020-12-01 DIAGNOSIS — E89.0 POSTSURGICAL HYPOTHYROIDISM: ICD-10-CM

## 2020-12-15 LAB
CREATININE, EXTERNAL: 1.19
HBA1C MFR BLD HPLC: 7.7 %
LDL-C, EXTERNAL: 113

## 2021-01-20 DIAGNOSIS — C73 PAPILLARY CARCINOMA OF THYROID (HCC): Primary | ICD-10-CM

## 2021-01-20 DIAGNOSIS — E89.0 POSTSURGICAL HYPOTHYROIDISM: ICD-10-CM

## 2021-04-12 DIAGNOSIS — E89.0 POSTSURGICAL HYPOTHYROIDISM: ICD-10-CM

## 2021-04-12 DIAGNOSIS — C73 PAPILLARY CARCINOMA OF THYROID (HCC): ICD-10-CM

## 2021-06-23 ENCOUNTER — OFFICE VISIT (OUTPATIENT)
Dept: ENDOCRINOLOGY | Age: 71
End: 2021-06-23
Payer: MEDICARE

## 2021-06-23 VITALS
BODY MASS INDEX: 39.12 KG/M2 | SYSTOLIC BLOOD PRESSURE: 178 MMHG | DIASTOLIC BLOOD PRESSURE: 71 MMHG | HEIGHT: 65 IN | HEART RATE: 63 BPM | WEIGHT: 234.8 LBS

## 2021-06-23 DIAGNOSIS — E89.0 POSTSURGICAL HYPOTHYROIDISM: ICD-10-CM

## 2021-06-23 DIAGNOSIS — C73 PAPILLARY CARCINOMA OF THYROID (HCC): Primary | ICD-10-CM

## 2021-06-23 LAB
CREATININE, EXTERNAL: 0.84
HBA1C MFR BLD HPLC: 6.6 %
LDL-C, EXTERNAL: 95

## 2021-06-23 PROCEDURE — G8417 CALC BMI ABV UP PARAM F/U: HCPCS | Performed by: INTERNAL MEDICINE

## 2021-06-23 PROCEDURE — 3017F COLORECTAL CA SCREEN DOC REV: CPT | Performed by: INTERNAL MEDICINE

## 2021-06-23 PROCEDURE — G8536 NO DOC ELDER MAL SCRN: HCPCS | Performed by: INTERNAL MEDICINE

## 2021-06-23 PROCEDURE — 1101F PT FALLS ASSESS-DOCD LE1/YR: CPT | Performed by: INTERNAL MEDICINE

## 2021-06-23 PROCEDURE — 99214 OFFICE O/P EST MOD 30 MIN: CPT | Performed by: INTERNAL MEDICINE

## 2021-06-23 PROCEDURE — G8400 PT W/DXA NO RESULTS DOC: HCPCS | Performed by: INTERNAL MEDICINE

## 2021-06-23 PROCEDURE — G8432 DEP SCR NOT DOC, RNG: HCPCS | Performed by: INTERNAL MEDICINE

## 2021-06-23 PROCEDURE — G8427 DOCREV CUR MEDS BY ELIG CLIN: HCPCS | Performed by: INTERNAL MEDICINE

## 2021-06-23 PROCEDURE — 1090F PRES/ABSN URINE INCON ASSESS: CPT | Performed by: INTERNAL MEDICINE

## 2021-06-23 RX ORDER — OLANZAPINE 2.5 MG/1
2.5 TABLET ORAL DAILY
COMMUNITY
Start: 2021-06-14

## 2021-06-23 RX ORDER — CARBAMAZEPINE 200 MG/1
TABLET ORAL
COMMUNITY
Start: 2021-06-14

## 2021-06-23 NOTE — PROGRESS NOTES
Chief Complaint   Patient presents with    Thyroid Problem     pcp and pharmacy verified. History of Present Illness: Fely Matta is a 78 y.o. female here for follow up of thyroid cancer and post-surgical hypothyroidism. Pt notes that it all started in January 2016. She was seeing Dr. Polly Ram for issues of multiple ear infections. Dr. Polly Ram noted a thyroid nodule. She was sent for an FNA, which came back positive for papillary cancer. Pt was taken to the OR on 4/15/16, her surgical pathology showed multifocal papillary thyroid cancer the largest being 4.5cm in size, no tumor capsule, no evidence of extra thyroidal extension. Pt was sent for TSH stimulated MURRAY, she received 106 mCi of I-131 on 6/24/16. The one week post WBS showed uptake in the thyroid bed only. Prior to MURRAY her TgAb was 137.9. In April 2018 I did a 4 week levothyroxine withdrawal followed by a WBS I-123, which did not show any evidence of activity to suggest recurrence or metastasis. In September 2019 she had a thyroid US which showed an empty thyroid bed and no LAD. At our last visit in September 2020 her TSH was elevated to 9.0 so I increased her LT4 dose from 125mcg daily to 150mcg daily. Pt was to follow up with me in 3 months, but she was lost to follow up and I have not seen her since September 2020. Pt is now living in a memory care facility because of her dementia. ([facility] [place], [place]  Per her daughter pt did have Shaista in January 2021. She has received both COVID vaccinations. Her daughter notes that her care facility is giving her the LT4 150mcg every day before breakfast.    \"She had her 6 month check up by Dr. Fabiola Guzman last week and he checked thyroid labs\". Will request these records. Pt's daughter notes that pt was having issues of facial nerve pain and her Neurologist started her on Tegertol, which has helped with the nerve pain.   She was diagnosed with mild dementia in 2019 and was started on Aricept. She is still taking the Tegertol for issues of trigeminal Neuralgia  She is following with Dr. Shaji Smart of Neurology    [facility] [place], [facility]. She denies issues of tremors, SOB, CP, diarrhea or heat or cold intolerance. She denies issues of dysphagia, dysphonia, chocking sensations, or hoarseness in her voice. Pt is no longer followed by [facility] at [facility], Dr. Anabel Moyer, for . 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 Pt denies issues of CP, SOB, HA, N/V. Because of her dementia, a majority of the history was obtained from her daughter. Current Outpatient Medications   Medication Sig    OLANZapine (ZyPREXA) 2.5 mg tablet Take 2.5 mg by mouth daily.  carBAMazepine (TEGretol) 200 mg tablet 200 mg in AM and 100 mg in PM    Euthyrox 150 mcg tablet TAKE 1 TABLET BY MOUTH ONCE DAILY BEFORE BREAKFAST    donepeziL (ARICEPT) 10 mg tablet Take 1 Tab by mouth daily (with dinner). TAKE 1/2 (ONE-HALF) TABLET BY MOUTH ONCE DAILY AT  SUPPER  AFTER  MEALS  FOR  1  MONTH  THEN  1  EVERY  DAY  AFTER  MEALS  THEREAFTER    ELIQUIS 5 mg tablet TAKE 1 TABLET BY MOUTH TWICE DAILY    CARTIA  mg ER capsule TAKE 1 CAPSULE BY MOUTH ONCE DAILY    cyanocobalamin (VITAMIN B-12) 1,000 mcg tablet Take 1,000 mcg by mouth daily.  ergocalciferol (VITAMIN D2) 50,000 unit capsule Take 50,000 Units by mouth every seven (7) days.  lovastatin (MEVACOR) 20 mg tablet Take 20 mg by mouth nightly. No current facility-administered medications for this visit.      Allergies   Allergen Reactions    Morphine Itching     Pt said she had severe itching after Knee replacement    Fiorinal [Butalbital-Aspirin-Caffeine] Other (comments)     Pt does not remeber     Review of Systems:  - Cardiovascular: no chest pain  - Neurological: no tremors  - Integumentary: skin is normal    Physical Examination:  Blood pressure (!) 178/71, pulse 63, height 5' 5\" (1.651 m), weight 234 lb 12.8 oz (106.5 kg). - General: pleasant, no distress, good eye contact   - Neck: no nodules or LAD palpated, no bruits, no TTP  - Cardiovascular: regular, normal rate, nl s1 and s2, no m/r/g   - Integumentary: skin is normal, no edema  - Neurological: reflexes 2+ at biceps, no tremors  - Psychiatric: normal mood and affect    Data Reviewed: Will request records from Dr. Roz Paz    Assessment/Plan:   1) Thyroid cancer > Pt has never shown any concerning features or physical findings, or evidence of serial imaging to suggest recurrence of her thyroid cancer. No further imaging needed, unless her Tg/TgAb start to change or she had a change in her clinical symptoms. 2) Post surgical hypothyroidism > Pt is clinically euthyroid on the LT4 150mcg daily. Will request labs from Dr. Roz Paz and adjust her dose as needed. Pt and her daughter voiced understanding and agreement with the plan. Will daughter with the results and any changes we may need to make. [phone] (daughter). RTC 3 months      Copy sent to:  Drs. Dineen Buerger and Nehemiah Corley

## 2021-06-24 ENCOUNTER — TELEPHONE (OUTPATIENT)
Dept: ENDOCRINOLOGY | Age: 71
End: 2021-06-24

## 2021-06-24 RX ORDER — LEVOTHYROXINE SODIUM 150 UG/1
TABLET ORAL
Qty: 30 TABLET | Refills: 5 | Status: SHIPPED | OUTPATIENT
Start: 2021-06-24

## 2021-06-24 NOTE — TELEPHONE ENCOUNTER
Called pt's daughter regarding her recent Thyroid labs. Her levels were looking good, pt to continue the LT4 150mcg daily.

## 2021-06-24 NOTE — PROGRESS NOTES
Received Records from Dr. J Carlos Alfaro,    Tg <0.2  TgAb 651  TSH 0.662  FT4 1.40    Pt to continue the LT4 150mcg daily.

## 2021-12-01 DIAGNOSIS — C73 PAPILLARY CARCINOMA OF THYROID (HCC): ICD-10-CM

## 2021-12-01 DIAGNOSIS — E89.0 POSTSURGICAL HYPOTHYROIDISM: ICD-10-CM

## 2022-08-05 ENCOUNTER — APPOINTMENT (OUTPATIENT)
Dept: CT IMAGING | Age: 72
End: 2022-08-05
Attending: EMERGENCY MEDICINE
Payer: MEDICARE

## 2022-08-05 ENCOUNTER — HOSPITAL ENCOUNTER (EMERGENCY)
Age: 72
Discharge: HOME | End: 2022-08-05
Attending: EMERGENCY MEDICINE
Payer: MEDICARE

## 2022-08-05 ENCOUNTER — APPOINTMENT (OUTPATIENT)
Dept: GENERAL RADIOLOGY | Age: 72
End: 2022-08-05
Attending: EMERGENCY MEDICINE
Payer: MEDICARE

## 2022-08-05 VITALS
DIASTOLIC BLOOD PRESSURE: 82 MMHG | TEMPERATURE: 98.9 F | BODY MASS INDEX: 39.94 KG/M2 | OXYGEN SATURATION: 100 % | HEART RATE: 94 BPM | SYSTOLIC BLOOD PRESSURE: 165 MMHG | WEIGHT: 240 LBS | RESPIRATION RATE: 18 BRPM

## 2022-08-05 DIAGNOSIS — S31.000A SACRAL WOUND, INITIAL ENCOUNTER: Primary | ICD-10-CM

## 2022-08-05 DIAGNOSIS — N94.9 ADNEXAL CYST: ICD-10-CM

## 2022-08-05 LAB
ALBUMIN SERPL-MCNC: 2.2 G/DL (ref 3.5–5)
ALBUMIN/GLOB SERPL: 0.4 {RATIO} (ref 1.1–2.2)
ALP SERPL-CCNC: 107 U/L (ref 45–117)
ALT SERPL-CCNC: 48 U/L (ref 12–78)
ANION GAP SERPL CALC-SCNC: 6 MMOL/L (ref 5–15)
APPEARANCE UR: CLEAR
AST SERPL-CCNC: 39 U/L (ref 15–37)
ATRIAL RATE: 394 BPM
BACTERIA URNS QL MICRO: ABNORMAL /HPF
BASOPHILS # BLD: 0 K/UL (ref 0–0.1)
BASOPHILS NFR BLD: 0 % (ref 0–1)
BILIRUB SERPL-MCNC: 0.7 MG/DL (ref 0.2–1)
BILIRUB UR QL CFM: NEGATIVE
BUN SERPL-MCNC: 26 MG/DL (ref 6–20)
BUN/CREAT SERPL: 22 (ref 12–20)
CALCIUM SERPL-MCNC: 9.1 MG/DL (ref 8.5–10.1)
CALCULATED R AXIS, ECG10: 32 DEGREES
CALCULATED T AXIS, ECG11: -144 DEGREES
CHLORIDE SERPL-SCNC: 103 MMOL/L (ref 97–108)
CO2 SERPL-SCNC: 35 MMOL/L (ref 21–32)
COLOR UR: ABNORMAL
CREAT SERPL-MCNC: 1.17 MG/DL (ref 0.55–1.02)
DIAGNOSIS, 93000: NORMAL
DIFFERENTIAL METHOD BLD: ABNORMAL
EOSINOPHIL # BLD: 0 K/UL (ref 0–0.4)
EOSINOPHIL NFR BLD: 0 % (ref 0–7)
EPITH CASTS URNS QL MICRO: ABNORMAL /LPF
ERYTHROCYTE [DISTWIDTH] IN BLOOD BY AUTOMATED COUNT: 13 % (ref 11.5–14.5)
GLOBULIN SER CALC-MCNC: 4.9 G/DL (ref 2–4)
GLUCOSE SERPL-MCNC: 207 MG/DL (ref 65–100)
GLUCOSE UR STRIP.AUTO-MCNC: 100 MG/DL
HCT VFR BLD AUTO: 41.9 % (ref 35–47)
HGB BLD-MCNC: 13.3 G/DL (ref 11.5–16)
HGB UR QL STRIP: ABNORMAL
IMM GRANULOCYTES # BLD AUTO: 0.1 K/UL (ref 0–0.04)
IMM GRANULOCYTES NFR BLD AUTO: 1 % (ref 0–0.5)
KETONES UR QL STRIP.AUTO: ABNORMAL MG/DL
LACTATE SERPL-SCNC: 1.9 MMOL/L (ref 0.4–2)
LEUKOCYTE ESTERASE UR QL STRIP.AUTO: NEGATIVE
LYMPHOCYTES # BLD: 0.8 K/UL (ref 0.8–3.5)
LYMPHOCYTES NFR BLD: 6 % (ref 12–49)
MCH RBC QN AUTO: 29.5 PG (ref 26–34)
MCHC RBC AUTO-ENTMCNC: 31.7 G/DL (ref 30–36.5)
MCV RBC AUTO: 92.9 FL (ref 80–99)
MONOCYTES # BLD: 1.1 K/UL (ref 0–1)
MONOCYTES NFR BLD: 9 % (ref 5–13)
NEUTS SEG # BLD: 10.7 K/UL (ref 1.8–8)
NEUTS SEG NFR BLD: 84 % (ref 32–75)
NITRITE UR QL STRIP.AUTO: NEGATIVE
NRBC # BLD: 0 K/UL (ref 0–0.01)
NRBC BLD-RTO: 0 PER 100 WBC
PH UR STRIP: 5.5 [PH] (ref 5–8)
PLATELET # BLD AUTO: 283 K/UL (ref 150–400)
PMV BLD AUTO: 9.6 FL (ref 8.9–12.9)
POTASSIUM SERPL-SCNC: 3.5 MMOL/L (ref 3.5–5.1)
PROT SERPL-MCNC: 7.1 G/DL (ref 6.4–8.2)
PROT UR STRIP-MCNC: 30 MG/DL
Q-T INTERVAL, ECG07: 346 MS
QRS DURATION, ECG06: 76 MS
QTC CALCULATION (BEZET), ECG08: 427 MS
RBC # BLD AUTO: 4.51 M/UL (ref 3.8–5.2)
RBC #/AREA URNS HPF: ABNORMAL /HPF (ref 0–5)
RBC MORPH BLD: ABNORMAL
SODIUM SERPL-SCNC: 144 MMOL/L (ref 136–145)
SP GR UR REFRACTOMETRY: >1.03 (ref 1–1.03)
TROPONIN-HIGH SENSITIVITY: 20 NG/L (ref 0–51)
UA: UC IF INDICATED,UAUC: ABNORMAL
UROBILINOGEN UR QL STRIP.AUTO: 0.2 EU/DL (ref 0.2–1)
VENTRICULAR RATE, ECG03: 92 BPM
WBC # BLD AUTO: 12.7 K/UL (ref 3.6–11)
WBC URNS QL MICRO: ABNORMAL /HPF (ref 0–4)

## 2022-08-05 PROCEDURE — 81001 URINALYSIS AUTO W/SCOPE: CPT

## 2022-08-05 PROCEDURE — 71045 X-RAY EXAM CHEST 1 VIEW: CPT

## 2022-08-05 PROCEDURE — 80053 COMPREHEN METABOLIC PANEL: CPT

## 2022-08-05 PROCEDURE — 83605 ASSAY OF LACTIC ACID: CPT

## 2022-08-05 PROCEDURE — 74011250636 HC RX REV CODE- 250/636: Performed by: EMERGENCY MEDICINE

## 2022-08-05 PROCEDURE — 70450 CT HEAD/BRAIN W/O DYE: CPT

## 2022-08-05 PROCEDURE — 36415 COLL VENOUS BLD VENIPUNCTURE: CPT

## 2022-08-05 PROCEDURE — 93005 ELECTROCARDIOGRAM TRACING: CPT

## 2022-08-05 PROCEDURE — 72192 CT PELVIS W/O DYE: CPT

## 2022-08-05 PROCEDURE — 84484 ASSAY OF TROPONIN QUANT: CPT

## 2022-08-05 PROCEDURE — 87040 BLOOD CULTURE FOR BACTERIA: CPT

## 2022-08-05 PROCEDURE — 85025 COMPLETE CBC W/AUTO DIFF WBC: CPT

## 2022-08-05 PROCEDURE — 99285 EMERGENCY DEPT VISIT HI MDM: CPT

## 2022-08-05 RX ORDER — SODIUM CHLORIDE 0.9 % (FLUSH) 0.9 %
5-10 SYRINGE (ML) INJECTION AS NEEDED
Status: DISCONTINUED | OUTPATIENT
Start: 2022-08-05 | End: 2022-08-05 | Stop reason: HOSPADM

## 2022-08-05 RX ORDER — CEPHALEXIN 500 MG/1
500 CAPSULE ORAL 4 TIMES DAILY
Qty: 28 CAPSULE | Refills: 0 | Status: SHIPPED | OUTPATIENT
Start: 2022-08-05 | End: 2022-08-12

## 2022-08-05 RX ORDER — SODIUM CHLORIDE 9 MG/ML
1000 INJECTION, SOLUTION INTRAVENOUS ONCE
Status: COMPLETED | OUTPATIENT
Start: 2022-08-05 | End: 2022-08-05

## 2022-08-05 RX ADMIN — SODIUM CHLORIDE 1000 ML: 9 INJECTION, SOLUTION INTRAVENOUS at 12:29

## 2022-08-05 NOTE — ED NOTES
I have reviewed discharge instructions with the patient. Medications discussed with patient and nursing facility.

## 2022-08-05 NOTE — ED NOTES
TRANSFER - OUT REPORT:    Verbal report given to  [place] with NINA on Tali Chapa  being transferred to [facility]  Report consisted of patients Situation, Background, Assessment and   Recommendations(SBAR). Information from the following report(s) SBAR, ED Summary, Procedure Summary, Intake/Output, MAR, Recent Results and Med Rec Status was reviewed with the receiving nurse. Lines:   Peripheral IV 08/05/22 Left Antecubital (Active)        Opportunity for questions and clarification was provided.       Patient transported with:  NINA pierson

## 2022-08-05 NOTE — PROGRESS NOTES
[facility] ED RN advises to arranged BLS transportation from [facility] to [facility] in [address].   Arranged BLS transport w/[facility] Hansa Schmitz) - ETA of 1700 - updated ED staff w/ETA

## 2022-08-05 NOTE — ED PROVIDER NOTES
65-year-old female with a history of diabetes, dementia presents with a chief complaint of altered mental status. According to nursing staff the patient seemed somewhat altered today at her nursing facility. She has not had a fever that we are aware of.   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 Gladis Jenkins @ [facility]     HX [address]    right optic nerve meningioma removed     NEUROLOGICAL PROCEDURE UNLISTED      gamma knife    ND TOTAL KNEE ARTHROPLASTY      left         Family History:   Problem Relation Age of Onset    Cancer Mother         breast    Dementia Mother     Stroke Father     Cancer Father     Stroke Maternal Grandfather     No Known Problems Sister     No Known Problems Brother     No Known Problems Sister     No Known Problems Brother     No Known Problems Brother        Social History     Socioeconomic History    Marital status:      Spouse name: Not on file    Number of children: Not on file    Years of education: Not on file    Highest education level: Not on file   Occupational History    Not on file   Tobacco Use    Smoking status: Former    Smokeless tobacco: Never   Substance and Sexual Activity    Alcohol use: No    Drug use: No    Sexual activity: Not on file   Other Topics Concern    Not on file   Social History Narrative    Not on file     Social Determinants of Health     Financial Resource Strain: Not on file   Food Insecurity: Not on file   Transportation Needs: Not on file   Physical Activity: Not on file   Stress: Not on file   Social Connections: Not on file   Intimate Partner Violence: Not on file   Housing Stability: Not on file         ALLERGIES: Morphine and Fiorinal [butalbital-aspirin-caffeine]    Review of Systems   Unable to perform ROS: Dementia     There were no vitals filed for this visit. Physical Exam  Vitals and nursing note reviewed. Constitutional:       General: She is not in acute distress. Appearance: Normal appearance. She is not toxic-appearing or diaphoretic. HENT:      Head: Normocephalic and atraumatic. Cardiovascular:      Rate and Rhythm: Normal rate. Pulses: Normal pulses. Pulmonary:      Effort: Pulmonary effort is normal. No respiratory distress. Abdominal:      General: There is no distension. Musculoskeletal:         General: Normal range of motion. Cervical back: Normal range of motion. Skin:     General: Skin is dry. Comments: Large sacral wounds. See picture below. Neurological:      Mental Status: She is alert. She is confused. Psychiatric:         Behavior: Behavior is not agitated. MDM  Number of Diagnoses or Management Options  Adnexal cyst  Sacral wound, initial encounter  Diagnosis management comments: Labs show slightly elevated white blood cell count. Patient is afebrile by rectal temp. CT of the pelvis shows no evidence of osteomyelitis. Labs otherwise unremarkable. Patient will be started on Keflex and is to follow-up with wound care.   She will be returned to her nursing facility. ED Course as of 08/05/22 1823   Fri Aug 05, 2022   1302 EKG shows atrial fibrillation at a rate of 92, otherwise normal intervals, normal axis, no ischemic changes.  [RD]      ED Course User Index  [RD] Jany Pfeiffer MD       Procedures

## 2022-08-05 NOTE — ED NOTES
Report given to Via Megan Parsons at [facility] in [employer].

## 2022-08-05 NOTE — DISCHARGE INSTRUCTIONS
The patient needs to be seen at the wound clinic for her sacral wound for further management. The patient also needs to have an endovaginal ultrasound scheduled by her primary care physician to evaluate the adnexal cyst which was noted on imaging today. Thank you for allowing us to provide you with medical care today. We realize that you have many choices for your emergency care needs. We thank you for choosing [employer]. Please choose us in the future for any continued health care needs. The exam and treatment you received in the Emergency Department were for an emergent problem and are not intended as complete care. It is important that you follow up with a doctor, nurse practitioner, or physician's assistant for ongoing care. If your symptoms worsen or you do not improve as expected and you are unable to reach your usual health care provider, you should return to the Emergency Department. We are available 24 hours a day. Please make an appointment with your health care provider(s) for follow up of your Emergency Department visit. Take this sheet with you when you go to your follow-up visit.

## 2022-08-13 LAB
BACTERIA SPEC CULT: NORMAL
BACTERIA SPEC CULT: NORMAL
SERVICE CMNT-IMP: NORMAL
SERVICE CMNT-IMP: NORMAL